# Patient Record
Sex: FEMALE | Race: WHITE | NOT HISPANIC OR LATINO | Employment: FULL TIME | URBAN - METROPOLITAN AREA
[De-identification: names, ages, dates, MRNs, and addresses within clinical notes are randomized per-mention and may not be internally consistent; named-entity substitution may affect disease eponyms.]

---

## 2017-10-09 ENCOUNTER — DOCUMENTATION ONLY (OUTPATIENT)
Dept: FAMILY MEDICINE | Facility: CLINIC | Age: 52
End: 2017-10-09

## 2017-10-09 NOTE — PROGRESS NOTES
Pre-Visit Chart Review  For Appointment Scheduled on 10/10/2017    Health Maintenance Due   Topic Date Due    Hepatitis C Screening  1965    TETANUS VACCINE  02/03/1982    Colonoscopy  02/03/2014    Lipid Panel  06/23/2014    Mammogram  01/31/2015    Pap Smear with HPV Cotest  01/31/2016    Influenza Vaccine  08/01/2017

## 2017-10-10 ENCOUNTER — OFFICE VISIT (OUTPATIENT)
Dept: FAMILY MEDICINE | Facility: CLINIC | Age: 52
End: 2017-10-10
Payer: COMMERCIAL

## 2017-10-10 VITALS
HEIGHT: 63 IN | DIASTOLIC BLOOD PRESSURE: 77 MMHG | HEART RATE: 65 BPM | TEMPERATURE: 99 F | SYSTOLIC BLOOD PRESSURE: 127 MMHG | WEIGHT: 183 LBS | BODY MASS INDEX: 32.43 KG/M2

## 2017-10-10 DIAGNOSIS — G89.29 CHRONIC PAIN OF LEFT HEEL: ICD-10-CM

## 2017-10-10 DIAGNOSIS — M79.672 CHRONIC PAIN OF LEFT HEEL: ICD-10-CM

## 2017-10-10 DIAGNOSIS — G89.29 CHRONIC NONINTRACTABLE HEADACHE, UNSPECIFIED HEADACHE TYPE: ICD-10-CM

## 2017-10-10 DIAGNOSIS — R51.9 CHRONIC NONINTRACTABLE HEADACHE, UNSPECIFIED HEADACHE TYPE: ICD-10-CM

## 2017-10-10 DIAGNOSIS — Z12.11 SCREEN FOR COLON CANCER: ICD-10-CM

## 2017-10-10 DIAGNOSIS — R53.83 FATIGUE, UNSPECIFIED TYPE: ICD-10-CM

## 2017-10-10 DIAGNOSIS — Z13.220 SCREENING FOR LIPID DISORDERS: ICD-10-CM

## 2017-10-10 DIAGNOSIS — Z00.00 ANNUAL PHYSICAL EXAM: Primary | ICD-10-CM

## 2017-10-10 DIAGNOSIS — R12 HEARTBURN: ICD-10-CM

## 2017-10-10 DIAGNOSIS — Z13.1 SCREENING FOR DIABETES MELLITUS: ICD-10-CM

## 2017-10-10 DIAGNOSIS — M72.2 PLANTAR FASCIITIS: ICD-10-CM

## 2017-10-10 DIAGNOSIS — Z12.39 BREAST CANCER SCREENING: ICD-10-CM

## 2017-10-10 DIAGNOSIS — R03.0 ELEVATED SYSTOLIC BLOOD PRESSURE READING WITHOUT DIAGNOSIS OF HYPERTENSION: ICD-10-CM

## 2017-10-10 DIAGNOSIS — R42 DIZZINESS: ICD-10-CM

## 2017-10-10 PROCEDURE — 99386 PREV VISIT NEW AGE 40-64: CPT | Mod: S$GLB,,, | Performed by: NURSE PRACTITIONER

## 2017-10-10 PROCEDURE — 99999 PR PBB SHADOW E&M-EST. PATIENT-LVL IV: CPT | Mod: PBBFAC,,, | Performed by: NURSE PRACTITIONER

## 2017-10-10 RX ORDER — NAPROXEN 500 MG/1
500 TABLET ORAL 2 TIMES DAILY WITH MEALS
Qty: 60 TABLET | Refills: 0 | Status: SHIPPED | OUTPATIENT
Start: 2017-10-10 | End: 2022-06-23

## 2017-10-10 NOTE — PROGRESS NOTES
Subjective:       Patient ID: Clyde Arellano is a 52 y.o. female.    Chief Complaint: Dizziness    HPI   Chief Complaint  Chief Complaint   Patient presents with    Dizziness       HPI  Clyde Arellano is a 52 y.o. female with multiple medical diagnoses as listed in the medical history and problem list that presents as a new patient to establish care, annual check up,  and  with a c/o dizziness with onset on 10/6/17 while driving. Dizziness had a sudden onset, felt like an off balance feeling, noticed a darkness coming over eyes like going to pass out and pulled car over.  Waited 30 minutes, still had some feeling of 'hangover' but was able to drive home. When she got home her  checked her blood pressure and it was elevated 150's/80's.  Since Friday has had intermittent spells of lightheadedness or dizziness without feeling like she is going to faint.  Did experience some nausea with dizziness for first two days.  Blood pressure checked at home on Sunday and elevated again, and relative gave patient HCTZ 12.5 mg several tablets and patient took one on Sunday, 2 on Monday, and one this morning.  Blood pressure is normal in office. Dizzy spells seem to be less frequent today.  Denies numbness and tingling.  Does suffer with headaches, gets a headache about 2 times per week, describes as throbbing to forehead and temples, positive phonophobia, negative photophobia, no aura. Headaches experienced for long time, years. Takes Goody powder x 2 with relief.  Also has pain to left foot/heel that is throbbing in nature, worse when first getting out of bed, improves with walking, but with throbbing when she goes to bed, relieved with Goody's powder some nights.        PAST MEDICAL HISTORY:  History reviewed. No pertinent past medical history.    PAST SURGICAL HISTORY:  Past Surgical History:   Procedure Laterality Date    DILATION AND CURETTAGE OF UTERUS      ELBOW FRACTURE SURGERY         SOCIAL HISTORY:  Social  History     Social History    Marital status:      Spouse name: N/A    Number of children: N/A    Years of education: N/A     Occupational History    Not on file.     Social History Main Topics    Smoking status: Never Smoker    Smokeless tobacco: Never Used    Alcohol use Yes    Drug use: No    Sexual activity: Yes     Partners: Male     Birth control/ protection: Post-menopausal     Other Topics Concern    Not on file     Social History Narrative    No narrative on file       FAMILY HISTORY:  Family History   Problem Relation Age of Onset    Cancer Maternal Grandfather      stomach    Cancer Sister      lymph/kidney       ALLERGIES AND MEDICATIONS: updated and reviewed.  Review of patient's allergies indicates:  No Known Allergies  Current Outpatient Prescriptions   Medication Sig Dispense Refill    naproxen (NAPROSYN) 500 MG tablet Take 1 tablet (500 mg total) by mouth 2 (two) times daily with meals. 60 tablet 0     No current facility-administered medications for this visit.        Review of Systems   Constitutional: Positive for fatigue. Negative for activity change, appetite change, chills, fever and unexpected weight change.        Fatigued, recently changed hours at work and is only sleeping about 5 hours per night   HENT: Negative for congestion, ear pain, postnasal drip, rhinorrhea, sore throat and trouble swallowing.    Eyes: Negative for visual disturbance.        Wears glasses at night to drive only   Respiratory: Negative for cough and shortness of breath.          states that patient snores, no witnessed apnea   Cardiovascular: Positive for leg swelling. Negative for chest pain and palpitations.        Some swelling to ankles with long periods of standing   Gastrointestinal: Negative for abdominal pain, blood in stool, constipation, diarrhea, nausea and vomiting.        Does get heartburn throughout the day x many years, uses TUMs OTC with relief   Genitourinary: Negative  "for difficulty urinating, frequency and menstrual problem.        Has had hot flashes x 10 years   Musculoskeletal: Positive for myalgias.        Left foot heel pain x several years, pain is worse when first getting OOB in AM or if off of feet for a while and then gets up, pain decreases with walking; Pain is throbbing to heel many nights at bedtime, takes Goodys powder with relief sometimes.    Skin: Negative for rash and wound.   Neurological: Positive for dizziness, light-headedness and headaches. Negative for tremors, weakness and numbness.   Hematological: Negative for adenopathy.   Psychiatric/Behavioral: Negative for dysphoric mood, sleep disturbance and suicidal ideas. The patient is not nervous/anxious.        Objective:       Vitals:    10/10/17 1529   BP: 127/77   BP Location: Right arm   Patient Position: Sitting   BP Method: Medium (Automatic)   Pulse: 65   Temp: 98.5 °F (36.9 °C)   TempSrc: Oral   Weight: 83 kg (183 lb)   Height: 5' 3" (1.6 m)     Physical Exam   Constitutional: She is oriented to person, place, and time. Vital signs are normal. She appears well-developed and well-nourished. No distress.   HENT:   Head: Normocephalic and atraumatic.   Right Ear: Tympanic membrane, external ear and ear canal normal.   Left Ear: Tympanic membrane, external ear and ear canal normal.   Nose: Nose normal. No mucosal edema.   Mouth/Throat: Oropharynx is clear and moist. No oropharyngeal exudate.   Eyes: Conjunctivae and EOM are normal. Pupils are equal, round, and reactive to light. Right eye exhibits no discharge. Left eye exhibits no discharge. Right conjunctiva is not injected. Left conjunctiva is not injected.   Neck: Normal range of motion and full passive range of motion without pain. Neck supple. Normal carotid pulses present. Carotid bruit is not present. No thyromegaly present.   Cardiovascular: Normal rate, regular rhythm, S1 normal, S2 normal, normal heart sounds and intact distal pulses.  Exam " reveals no gallop and no friction rub.    No murmur heard.  Pulses:       Carotid pulses are 2+ on the right side, and 2+ on the left side.       Radial pulses are 2+ on the right side, and 2+ on the left side.        Posterior tibial pulses are 2+ on the right side, and 2+ on the left side.   Pulmonary/Chest: Effort normal and breath sounds normal. No respiratory distress. She has no decreased breath sounds. She has no wheezes. She has no rhonchi. She has no rales.   Abdominal: Soft. Bowel sounds are normal. She exhibits no distension, no abdominal bruit, no pulsatile midline mass and no mass. There is no hepatosplenomegaly. There is no tenderness. No hernia.   Musculoskeletal: She exhibits no edema or deformity.        Left foot: There is tenderness.   Tenderness with palpation to posterior foot proximal to heel, point tenderness with pressure over center of heel   Lymphadenopathy:     She has no cervical adenopathy.   Neurological: She is alert and oriented to person, place, and time. She has normal strength. No cranial nerve deficit.   Skin: Skin is warm, dry and intact. Capillary refill takes less than 2 seconds. No rash noted. She is not diaphoretic. No pallor.   Psychiatric: She has a normal mood and affect. Her speech is normal and behavior is normal. Judgment and thought content normal. Her mood appears not anxious. Cognition and memory are normal. She does not exhibit a depressed mood.   Nursing note and vitals reviewed.      Assessment:       1. Annual physical exam    2. Dizziness    3. Chronic nonintractable headache, unspecified headache type    4. Chronic pain of left heel    5. Plantar fasciitis    6. Fatigue, unspecified type    7. Elevated systolic blood pressure reading without diagnosis of hypertension    8. Screening for diabetes mellitus    9. Breast cancer screening    10. Screening for lipid disorders    11. Screen for colon cancer    12. Heartburn    13. BMI 32.0-32.9,adult        Plan:    Clyde was seen today for dizziness.  Patient is very resistant to preventative care.  I asked the patient what she is willing to do at this time. Plan was made based on patient's willingness to participate.   Diagnoses and all orders for this visit:    Annual physical exam  -     CBC auto differential; Future  -     Comprehensive metabolic panel; Future  -     Lipid panel; Future    Dizziness   Possible benign vertigo vs dizziness associated with hypertension, seems to be resolving per patient report.     Educational handouts provided. Dizziness and Vertigo    Chronic nonintractable headache, unspecified headache type  -     naproxen (NAPROSYN) 500 MG tablet; Take 1 tablet (500 mg total) by mouth 2 (two) times daily with meals.    Chronic pain of left heel  -     X-Ray Foot Complete Left; Future  -     naproxen (NAPROSYN) 500 MG tablet; Take 1 tablet (500 mg total) by mouth 2 (two) times daily with meals.    Plantar fasciitis  -     X-Ray Foot Complete Left; Future  -     naproxen (NAPROSYN) 500 MG tablet; Take 1 tablet (500 mg total) by mouth 2 (two) times daily with meals.  - Educational handouts provided.   - Discussed and demonstrated exercises to patient to stretch tendon to relieve pain, roll foot over frozen water bottle when seated.  Advised doing exercises 2 times daily.  Do not go barefoot, wear shoe with good arch support.    Fatigue, unspecified type  -     CBC auto differential; Future  -     Comprehensive metabolic panel; Future  -     TSH; Future  - Fatigue may be due to short sleep time, discussed earlier bedtime    Elevated systolic blood pressure reading without diagnosis of hypertension  -     Comprehensive metabolic panel; Future  - Patient may have hypertension but has been taking HCTZ 12.5 mg (relative's medication) x 3 days and blood pressure is normal today.  Plan to stop HCTZ and return in one week for nurse visit for blood pressure check.  If BP elevated at nurse visit, will prescribe  anti-hypertensive.    Screening for diabetes mellitus  -     Comprehensive metabolic panel; Future    Breast cancer screening  -     Mammo Digital Screening Bilat with CAD; Future    Screening for lipid disorders  -     Lipid panel; Future    Screen for colon cancer  -     Fecal Immunochemical Test (iFOBT); Future   Patient refused colonoscopy but is willing to do Fit Kit    Heartburn   Continue Tums as needed, decrease use of Goody's powder, if no improvement may consider ranitidine or short term PPI     BMI 32.0-32.9,adult    Weight loss recommended with well balanced diet and portion controlled meals and increased activity.     Patient to follow up one week for nurse visit blood pressure check, 4 weeks for office visit with Pap.     Patient readiness: acceptance and barriers:none    During the course of the visit the patient was educated and counseled about the following:     Obesity:   General weight loss/lifestyle modification strategies discussed (elicit support from others; identify saboteurs; non-food rewards, etc).  Informal exercise measures discussed, e.g. taking stairs instead of elevator.    Goals: Obesity: Reduce calorie intake and BMI    Did patient meet goals/outcomes: No    The following self management tools provided: declined    Patient Instructions (the written plan) was given to the patient/family.     Time spent with patient: 15 minutes

## 2017-10-10 NOTE — PATIENT INSTRUCTIONS
Dizziness (Uncertain Cause)  Dizziness is a common symptom. It may be described as lightheadedness, spinning, or feeling like you are going to faint. Dizziness can have many causes.  Be sure to tell the healthcare provider about:  · All medicines you take, including prescription, over-the-counter, herbs, and supplements  · Any other symptoms you have  · Any health problems you are being treated for  · Anything that causes the dizziness to get worse or better  Today's exam did not show an exact cause for your dizziness. Other tests may be needed. Follow up with your healthcare provider.  Home care  · Dizziness that occurs with sudden standing may be a sign of mild dehydration. Drink extra fluids for the next few days.  · If you recently started a new medicine, stopped a medicine, or had the dose of a current medicine changed, talk with the prescribing healthcare provider. Your medicine plan may need adjustment.  · If dizziness lasts more than a few seconds, sit or lie down until it passes. This may help prevent injury in case you pass out.  · Do not drive or use power tools or dangerous equipment until you have had no dizziness for at least 48 hours.  Follow-up care  Follow up with your healthcare provider for further evaluation within the next 7 days or as advised.  When to seek medical advice  Call your healthcare provider for any of the following:  · Worsening of symptoms or new symptoms  · Passing out or seizure  · Repeated vomiting  · Headache  · Palpitations (the sense that your heart is fluttering or beating fast or hard)  · Shortness of breath  · Blood in vomit or stool (black or red color)  · Weakness of an arm or leg or one side of the face  · Vision or hearing changes  · Trouble walking or speaking  · Chest, arm, neck, back, or jaw pain  Date Last Reviewed: 8/23/2015  © 6585-7075 [employer]. [address] [place] [address], [place] [address]. All rights reserved. This information is not intended as  "a substitute for professional medical care. Always follow your healthcare professional's instructions.        Headache, Unspecified    A number of things can cause headaches. The cause of your headache isnt clear. But it doesnt seem to be a sign of any serious illness.  You could have a tension headache or a migraine headache.  Stress can cause a tension headache. This can happen if you tense the muscles of your shoulders, neck, and scalp without knowing it. If this stress lasts long enough, you may develop a tension headache.  It is not clear why migraines occur, but certain things called" triggers" can raise the risk of having a migraine attack. Migraine triggers may include emotional stress or depression, or by hormone changes during the menstrual cycle. Other triggers include birth control pills and other medicines, alcohol or caffeine, foods with tyramine (such as aged cheese, wine), eyestrain, weather changes, missed meals, and lack of sleep or oversleeping.  Other causes of headache include:  · Viral illness with high fever  · Head injury with concussion  · Sinus, ear, or throat infection  · Dental pain and jaw joint (TMJ) pain  More serious but less common causes of headache include stroke, brain hemorrhage, brain tumor, meningitis, and encephalitis.  Home care  Follow these tips when taking care of yourself at home:  · Dont drive yourself home if you were given pain medicine for your headache. Instead, have someone else drive you home. Try to sleep when you get home. You should feel much better when you wake up.  · Apply heat to the back of your neck to ease a neck muscle spasm. Take care of a migraine headache by putting an ice pack on your forehead or at the base of your skull.  · If you have nausea or vomiting, eat a light diet until your headache eases.  · If you have a migraine headache, use sunglasses when in the daylight or around bright indoor lighting until your symptoms get better. Bright " glaring light can make this type of headache worse.  Follow-up care  Follow up with your healthcare provider, or as advised. Talk with your provider if you have frequent headaches. He or she can help figure out a treatment plan. By knowing the earliest signs of headache, and starting treatment right away, you may be able to stop the pain yourself.  When to seek medical advice  Call your healthcare provider right away if any of these occur:  · Your head pain suddenly gets worse after sexual intercourse or strenuous activity  · Your head pain doesnt get better within 24 hours  · You arent able to keep liquids down (repeated vomiting)  · Fever of 100.4ºF (38ºC) or higher, or as directed by your healthcare provider  · Stiff neck  · Extreme drowsiness, confusion, or fainting  · Dizziness or dizziness with spinning sensation (vertigo)  · Weakness in an arm or leg or one side of your face  · You have trouble talking or seeing  Date Last Reviewed: 8/1/2016  © 9962-6601 [employer]. [address] [place] [address]. All rights reserved. This information is not intended as a substitute for professional medical care. Always follow your healthcare professional's instructions.        Rebound Headache     Overuse of pain medications can lead to rebound headaches.   You use pain medicines called analgesics to treat your headaches. You are now having more frequent or intense headaches (rebound headaches). They are your bodys response to too much pain medicine. Prescription pain medicines can cause these headaches. But so can over-the-counter medicines like acetaminophen or ibuprofen. A drug that contains caffeine or butalbital is most likely to cause rebound headache.  Symptoms of rebound headache include:  · Mild to moderate headache for 15 or more days each month for 3 months or more  · Headache when you wake up that continues most of the day  · Headaches getting worse over time  · Need for more and more  medicine to treat headaches  Rebound headaches are most often diagnosed by your symptoms and medicine history. You may need tests to rule out other causes of your headaches. In the emergency room, you may be given a non-analgesic pain medicine to treat the pain or stop future headaches.  Home care  Treatment involves stopping use of your pain medicines. Your healthcare provider can tell you how to safely do this. You may be able to stop right away. Or you may need to take less and less over time (taper off). This will depend on the medicines you have been taking. To do this, follow the schedule that your provider gives you. If you are taking pain medicines for other types of pain at the same time, your healthcare provider may need other specialists to participate in your care.  · For the first week or so after stopping, the headaches will likely get worse. You may also have withdrawal symptoms. These often include nausea, vomiting, and trouble sleeping. You may be given a medicine to help relieve pain and withdrawal symptoms. Take this exactly as you have been told. It is vital to avoid taking daily pain medicine. If you do so, rebound headaches will continue.  · Caffeine can make rebound headaches worse. If you have caffeinated drinks every day, slowly cut your intake.  · Keep a written log of your headaches. This can help you and your healthcare provider track your progress.  · Be patient. It can take about 2 to 6 months to stop having rebound headaches.  · Once you have broken the headache cycle, be careful not to start it again. Work with your provider to make a treatment plan for headache pain that has low risk of causing rebound headaches.  · Relaxation can help lower tension and relieve pain. Try a massage, meditation, yoga, or other relaxation techniques. Or make time for a relaxing hobby that you enjoy.  Follow-up care  Follow up with your healthcare provider, or as advised.  When to seek medical  advice  Call your healthcare provider right away if any of these occur:  · Fever of 100.4°F (38°C) or higher  · Headaches that wake you from sleep  · Repeated vomiting or visual problems that don't go away  · Headache with a stiff neck, rash, confusion, weakness, numbness, seizure (convulsion), or trouble talking  · Headache that starts after a head injury or fall  · A type of headache you have never had before  · Headache that gets worse despite rest and medicine  Date Last Reviewed: 11/20/2015 © 2000-2017 [employer]. [address] [place] [id number]. All rights reserved. This information is not intended as a substitute for professional medical care. Always follow your healthcare professional's instructions.        Treating Plantar Fasciitis  First, your healthcare provider tries to determine the cause of your problem in order to suggest ways to relieve pain. If your pain is due to poor foot mechanics, custom-made shoe inserts (orthoses) may help.    Reduce symptoms  · To relieve mild symptoms, try aspirin, ibuprofen, or other medicines as directed. Rubbing ice on the affected area may also help.  · To reduce severe pain and swelling, your healthcare provider may prescribe pills or injections or a walking cast in some instances. Physical therapy, such as ultrasound or a daily stretching program, may also be recommended. Surgery is rarely required.  · To reduce symptoms caused by poor foot mechanics, your foot may be taped. This supports the arch and temporarily controls movement. Night splints may also help by stretching the fascia.  Control movement  If taping helps, your healthcare provider may prescribe orthoses. Built from plaster casts of your feet, these inserts control the way your foot moves. As a result, your symptoms should go away.  Reduce overuse  Every time your foot strikes the ground, the plantar fascia is stretched. You can reduce the strain on the plantar fascia and the  possibility of overuse by following these suggestions:  · Lose any excess weight.  · Avoid running on hard or uneven ground.  · Use orthoses at all times in your shoes and house slippers.  If surgery is needed  Your healthcare provider may consider surgery if other types of treatment don't control your pain. During surgery, the plantar fascia is partially cut to release tension. As you heal, fibrous tissue fills the space between the heel bone and the plantar fascia.   Date Last Reviewed: 10/14/2015  © 3970-8735 [employer]. [address] [place] [address]. All rights reserved. This information is not intended as a substitute for professional medical care. Always follow your healthcare professional's instructions.        Understanding Plantar Fasciitis    Plantar fasciitis is a condition that causes foot and heel pain. The plantar fascia is a tough band of tissue that runs across the bottom of the foot from the heel to the toes. This tissue pulls on the heel bone. It supports the arch of the foot as it pushes off the ground. If the tissue becomes irritated or red and swollen (inflamed), it is called plantar fasciitis.  How to say it  PLAN-tuhr [id number]   What causes plantar fasciitis?  Plantar fasciitis most often occurs from overusing the plantar fascia. The tissue may become damaged from activities that put repeated stress on the heel and foot. Or it may wear down over time with age and ankle stiffness. You are more likely to have plantar fasciitis if you:  · Do activities that require a lot of running, jumping, or dancing  · Have a job that requires being on your feet for long periods  · Are overweight or obese  · Have certain foot problems, such as a tight Achilles tendon, flat feet, or high arches  · Often wear poorly fitting shoes  Symptoms of plantar fasciitis  The condition most often causes pain in the heel and the bottom of the foot. The pain may occur when you take your first  steps in the morning. It may get better as you walk throughout the day. But as you continue to put weight on the foot, the pain often returns. Pain may also occur after standing or sitting for long periods.  Treating plantar fasciitis  Treatments for plantar fasciitis include:  · Resting the foot. This involves limiting movements that make your foot hurt. You may also need to avoid certain sports and types of work for a time.  · Using cold packs. Put an ice pack on the heel and foot to help reduce pain and swelling.  · Taking pain medicines. Prescription and over-the-counter pain medicines can help relieve pain and swelling.  · Using heel cups or foot inserts (orthotics). These are placed in the shoes to help support the heel or arch and cushion the heel. You may also be told to buy proper-fitting shoes with good arch support and cushioned soles.  · Taping the foot. This supports the arch and limits the movement of the plantar fascia to help relieve symptoms.  · Wearing a night splint. This stretches the plantar fascia and leg muscles while you sleep. This may help relieve pain.  · Doing exercises and physical therapy. These stretch and strengthen the plantar fascia and the muscles in the leg that support the heel and foot.  · Getting shots of medicine into the foot. These may help relieve symptoms for a time.  · Having surgery. This may be needed if other treatments fail to relieve symptoms. During surgery, the surgeon may partially cut the plantar fascia to release tension.  Possible complications of plantar fasciitis  Without proper care and treatment, healing may take longer than normal. Also, symptoms may continue or get worse. Over time, the plantar fascia may be damaged. This can make it hard to walk or even stand without pain.  When to call your healthcare provider  Call your healthcare provider right away if you have any of these:  · Fever of 100.4°F (38°C) or higher, or as directed  · Symptoms that dont  get better with treatment, or get worse  · New symptoms, such as numbness, tingling, or weakness in the foot   Date Last Reviewed: 3/10/2016  © 5522-0832 [employer]. [address] [place] [address], [place] [address]. All rights reserved. This information is not intended as a substitute for professional medical care. Always follow your healthcare professional's instructions.        Vertigo (Unknown Cause)    In addition to helping with hearing, the inner ear is part of the balance center of your body. Problems with the inner ear can a false feeling of motion. This is called vertigo. Often, it feels as if you or the room is spinning. A vertigo attack may cause sudden nausea, vomiting and heavy sweating. Severe vertigo causes a loss of balance and can cause you to fall. During vertigo, small head movements and changes in body position will often make the symptoms worse. You may also have ringing in the ears called tinnitus.  An episode of vertigo may last seconds, minutes or hours. Once you are over the first episode, it may never come back. However, symptoms may return off and on.  The cause of your vertigo is not yet known. Possible causes of vertigo include:  · Inflammation of the inner ear  · Disease of the nerves to the inner ear  · Movement of calcium particles in the inner ear  · Poor blood flow to the balance centers of the brain  · Migraine headaches  Home care  · If symptoms are severe, rest quietly in bed. Change positions very slowly. There is usually one position that will feel best, such as lying on one side or lying on your back with your head slightly raised on pillows.  · Do not drive a car or work with dangerous machinery until symptoms have been gone for at least one week.  · Take medicine as prescribed to relieve your symptoms. Unless another medicine was prescribed for symptoms of nausea, vomiting, and dizziness, you may use over-the-counter motion sickness pills. Ask your pharmacist for  suggestions.  Follow-up care  Follow up with your healthcare provider or as directed. If you are referred to a specialist or for testing, make the appointment promptly.  When to seek medical advice  Call your healthcare provider if any of the following occur:  · Fever of 100.4°F (38ºC) or higher, or as directed by your healthcare provider  · Vertigo worsens or is not controlled by prescribed medicine   · Repeated vomiting not relieved by prescribed medicine   · Severe headache  · Confusion  · Weakness of an arm or leg or one side of the face  · Difficulty with speech or vision  · Loss of consciousness   · Seizure  Date Last Reviewed: 8/16/2015  © 9837-8659 [employer]. [address] [place] [address], [place] [address]. All rights reserved. This information is not intended as a substitute for professional medical care. Always follow your healthcare professional's instructions.

## 2017-10-13 ENCOUNTER — HOSPITAL ENCOUNTER (OUTPATIENT)
Dept: RADIOLOGY | Facility: CLINIC | Age: 52
Discharge: HOME | End: 2017-10-13
Attending: NURSE PRACTITIONER
Payer: COMMERCIAL

## 2017-10-13 VITALS — HEIGHT: 63 IN | WEIGHT: 183 LBS | BODY MASS INDEX: 32.43 KG/M2

## 2017-10-13 DIAGNOSIS — Z12.39 BREAST CANCER SCREENING: ICD-10-CM

## 2017-10-13 DIAGNOSIS — M72.2 PLANTAR FASCIITIS: ICD-10-CM

## 2017-10-13 DIAGNOSIS — M79.672 CHRONIC PAIN OF LEFT HEEL: ICD-10-CM

## 2017-10-13 DIAGNOSIS — G89.29 CHRONIC PAIN OF LEFT HEEL: ICD-10-CM

## 2017-10-13 PROCEDURE — 77067 SCR MAMMO BI INCL CAD: CPT | Mod: TC

## 2017-10-13 PROCEDURE — 77063 BREAST TOMOSYNTHESIS BI: CPT | Mod: 26,,, | Performed by: RADIOLOGY

## 2017-10-13 PROCEDURE — 77067 SCR MAMMO BI INCL CAD: CPT | Mod: 26,,, | Performed by: RADIOLOGY

## 2017-10-13 PROCEDURE — 73630 X-RAY EXAM OF FOOT: CPT | Mod: TC,PO,LT

## 2017-10-13 PROCEDURE — 73630 X-RAY EXAM OF FOOT: CPT | Mod: 26,LT,S$GLB, | Performed by: RADIOLOGY

## 2017-10-14 ENCOUNTER — TELEPHONE (OUTPATIENT)
Dept: FAMILY MEDICINE | Facility: CLINIC | Age: 52
End: 2017-10-14

## 2017-10-17 ENCOUNTER — CLINICAL SUPPORT (OUTPATIENT)
Dept: FAMILY MEDICINE | Facility: CLINIC | Age: 52
End: 2017-10-17
Payer: COMMERCIAL

## 2017-10-17 VITALS — DIASTOLIC BLOOD PRESSURE: 72 MMHG | SYSTOLIC BLOOD PRESSURE: 126 MMHG

## 2017-10-17 DIAGNOSIS — I10 ESSENTIAL HYPERTENSION: Primary | ICD-10-CM

## 2017-10-17 PROCEDURE — 99499 UNLISTED E&M SERVICE: CPT | Mod: S$GLB,,, | Performed by: FAMILY MEDICINE

## 2017-10-21 ENCOUNTER — LAB VISIT (OUTPATIENT)
Dept: LAB | Facility: HOSPITAL | Age: 52
End: 2017-10-21
Payer: COMMERCIAL

## 2017-10-21 DIAGNOSIS — Z12.11 SCREEN FOR COLON CANCER: ICD-10-CM

## 2017-10-21 PROCEDURE — 82274 ASSAY TEST FOR BLOOD FECAL: CPT

## 2017-10-24 LAB — HEMOCCULT STL QL IA: NEGATIVE

## 2017-10-26 ENCOUNTER — TELEPHONE (OUTPATIENT)
Dept: FAMILY MEDICINE | Facility: CLINIC | Age: 52
End: 2017-10-26

## 2017-11-06 ENCOUNTER — DOCUMENTATION ONLY (OUTPATIENT)
Dept: FAMILY MEDICINE | Facility: CLINIC | Age: 52
End: 2017-11-06

## 2017-11-06 NOTE — PROGRESS NOTES
Pre-Visit Chart Review  For Appointment Scheduled on 11/7/2017    Health Maintenance Due   Topic Date Due    Hepatitis C Screening  1965    TETANUS VACCINE  02/03/1982    Colonoscopy  02/03/2014    Pap Smear with HPV Cotest  01/31/2016    Influenza Vaccine  08/01/2017

## 2017-11-07 ENCOUNTER — OFFICE VISIT (OUTPATIENT)
Dept: FAMILY MEDICINE | Facility: CLINIC | Age: 52
End: 2017-11-07
Payer: COMMERCIAL

## 2017-11-07 ENCOUNTER — OFFICE VISIT (OUTPATIENT)
Dept: PODIATRY | Facility: CLINIC | Age: 52
End: 2017-11-07
Payer: COMMERCIAL

## 2017-11-07 VITALS
DIASTOLIC BLOOD PRESSURE: 73 MMHG | WEIGHT: 182 LBS | BODY MASS INDEX: 33.49 KG/M2 | SYSTOLIC BLOOD PRESSURE: 115 MMHG | HEART RATE: 59 BPM | TEMPERATURE: 98 F | HEIGHT: 62 IN

## 2017-11-07 VITALS — WEIGHT: 182.13 LBS | HEIGHT: 62 IN | BODY MASS INDEX: 33.51 KG/M2

## 2017-11-07 DIAGNOSIS — Z01.419 CERVICAL SMEAR, AS PART OF ROUTINE GYNECOLOGICAL EXAMINATION: ICD-10-CM

## 2017-11-07 DIAGNOSIS — M21.6X2 ACQUIRED EQUINUS DEFORMITY OF LEFT FOOT: ICD-10-CM

## 2017-11-07 DIAGNOSIS — M72.2 PLANTAR FASCIITIS OF LEFT FOOT: ICD-10-CM

## 2017-11-07 DIAGNOSIS — M79.672 CHRONIC PAIN OF LEFT HEEL: Primary | ICD-10-CM

## 2017-11-07 DIAGNOSIS — G89.29 CHRONIC HEEL PAIN, LEFT: ICD-10-CM

## 2017-11-07 DIAGNOSIS — M72.2 PLANTAR FASCIITIS, LEFT: ICD-10-CM

## 2017-11-07 DIAGNOSIS — Z01.419 ENCOUNTER FOR GYNECOLOGICAL EXAMINATION WITHOUT ABNORMAL FINDING: Primary | ICD-10-CM

## 2017-11-07 DIAGNOSIS — M79.672 CHRONIC HEEL PAIN, LEFT: ICD-10-CM

## 2017-11-07 DIAGNOSIS — G89.29 CHRONIC PAIN OF LEFT HEEL: Primary | ICD-10-CM

## 2017-11-07 PROCEDURE — 99999 PR PBB SHADOW E&M-EST. PATIENT-LVL II: CPT | Mod: PBBFAC,,, | Performed by: PODIATRIST

## 2017-11-07 PROCEDURE — 88175 CYTOPATH C/V AUTO FLUID REDO: CPT

## 2017-11-07 PROCEDURE — 99203 OFFICE O/P NEW LOW 30 MIN: CPT | Mod: S$GLB,,, | Performed by: PODIATRIST

## 2017-11-07 PROCEDURE — 99396 PREV VISIT EST AGE 40-64: CPT | Mod: S$GLB,,, | Performed by: NURSE PRACTITIONER

## 2017-11-07 PROCEDURE — 99999 PR PBB SHADOW E&M-EST. PATIENT-LVL IV: CPT | Mod: PBBFAC,,, | Performed by: NURSE PRACTITIONER

## 2017-11-07 RX ORDER — METHYLPREDNISOLONE 4 MG/1
TABLET ORAL
Qty: 1 PACKAGE | Refills: 0 | Status: SHIPPED | OUTPATIENT
Start: 2017-11-07 | End: 2017-11-28

## 2017-11-07 NOTE — PATIENT INSTRUCTIONS
1. Stretch calf and plantar fascia 3x per day for 30 sec and before getting out of bed.    2. Supportive shoes at all times (athletic shoe including lawrence, new balance, asics, HOKA or casual shoes like Dansko, Susana, Naot, Vionoic, Fit flop  clog or wedge with extra heel padding and arch support.    (Varsity sports, Phidippides, [place] running company, Masseys, Goodfeet, Cantilever, Feet First, Foot Solutions, Therapeutic shoes, SAS, [facility] inDegree center pro shop) http://[email or web address].ObsEva/    3. Orthotic (recommend the following brands: Superfeet, Spenco, Powerstep, Sof Sole Fit Series)    4. NSAID's for 2-3 weeks if no GI or Kidney problems (example: ibuprofen 600 mg 2 x per day)    5. ICE massage with frozen water bottle 2x per day for 30 minutes.    6.  therapy and/or steroid injection    What Is Plantar Fasciitis?   The plantar fascia is a ligament-like band running from your heel to the ball of your foot. This band pulls on the heel bone, raising the arch of your foot as it pushes off the ground. But if your foot moves incorrectly, the plantar fascia may become strained. The fascia may swell and its tiny fibers may begin to fray, causing plantar fasciitis.  Causes  Plantar fasciitis is often caused by poor foot mechanics. If your foot flattens too much, the fascia may overstretch and swell. If your foot flattens too little, the fascia may ache from being pulled too tight.    The plantar fascia is a thick, fibrous layer of tissue that covers the bones on the bottom of your foot. It holds the foot bones in an arched position. Plantar fasciitis is a painful swelling of the plantar fascia.  A heel spur is an overgrowth of bone where the plantar fascia attaches to the heel bone. The heel spur itself usually doesnt cause pain. However, the heel spur might be a sign of plantar fasciitis which may cause your foot pain. There is no specific treatment for heel spurs.   Plantar fasciitis can develop slowly or  suddenly. It usually affects one foot at a time. Heel pain can feel sharp, like a knife sticking into the bottom of your foot. You may feel pain after exercising, long-distance jogging, stair climbing, long periods of standing, or after standing up.  Risk factors for plantar fasciitis include: arthritis, diabetes, obesity or recent weight gain, flat foot, and having high arches. Wearing high heels, loose shoes, or shoes with poor arch support adds to the risk.    Foot pain is usually worse in the morning. But it improves with walking. By the end of the day there may be a dull aching. Treatment includes short-term rest and controlling inflammation. It may take up to 9 months before all symptoms go away. In rare cases, a steroid injection in the foot, or surgery, may be needed.  Home care  · If you are overweight, lose weight to help healing.  · Choose supportive shoes with good arch support and shock absorbency. Replace athletic shoes when they become worn out. Dont walk or run barefoot.  · Premade or custom-fitted shoe inserts may be helpful. Inserts made of silicone seem to be the most effective. Custom-made inserts can be provided by a podiatrist or foot specialist, physical therapist, or orthopedist.  · Premade or custom-made night splints keep the heel stretched out while you sleep. They may prevent morning pain.  · Avoid activities that stress the feet: jogging, prolonged standing or walking, contact sports, etc.  · First thing in the morning and before sports, stretch the bottom of your foot. Gently flex your ankle so the toes move toward your knee.  · Icing may help control heel pain. Apply an ice pack to the heel for 10-20 minutes as a preventive. Or ice your heel after a severe flare-up of symptoms. You may repeat this every 1-2 hours as needed.  · You may use over-the-counter pain medicine to control pain, unless another medicine was prescribed. Anti-inflammatory pain medicines, such as ibuprofen or  naproxen, may work better than acetaminophen. If you have chronic liver or kidney disease or ever had a stomach ulcer or GI bleeding, talk with your healthcare provider before using these medicines.  · Shoe inserts, a night splint, or a special boot may be needed. Use these as directed by your healthcare provider.      Treating Plantar Fasciitis    First, your doctor relieves pain. Then, the cause of your problem may be found and corrected. If your pain is due to poor foot mechanics, custom-made shoe inserts (orthoses) may help.    Reduce Symptoms:  · To relieve mild symptoms, try aspirin, ibuprofen, or other medications as directed. Rubbing ice on the affected area may also help.  · To reduce severe pain and swelling, your doctor may prescribe pills or injections or a walking cast in some instances. Physical therapy, such as ultrasound or a daily stretching program, may also be recommended. Surgery is rarely required.  · To reduce symptoms caused by poor foot mechanics, your foot may be taped. This supports the arch and temporarily controls movement. Night splints may also help by stretching the fascia.    Control Movement  If taping helps, your doctor may prescribe orthoses. Built from plaster casts of your feet, these inserts control the way your foot moves. As a result, your symptoms should go away.  If Surgery Is Needed  Your doctor may consider surgery if other types of treatment don't control your pain. During surgery, the plantar fascia is partially cut to release tension. As you heal, fibrous tissue fills the space between the heel bone and the plantar fascia.   Reduce Overuse  Every time your foot strikes the ground, the plantar fascia is stretched. You can reduce the strain on the plantar fascia and the possibility of overuse by following these suggestions:  · Lose any excess weight.  · Avoid running on hard or uneven ground.  · Use orthoses at all times in your shoes and house slippers.  © 9302-6639 The  [employer]. [address] [place] [address]. All rights reserved. This information is not intended as a substitute for professional medical care. Always follow your healthcare professional's instructions.            Lower Body Exercises: Calf Stretch    This exercise both stretches and strengthens your lower body to help your back. Do the exercise as often as suggested by your health care provider. As you work out, dont rush or strain. Use an exercise mat, pillow, or folded towel to protect your knees and other sensitive areas.  · Face a wall 2 feet away. Step toward the wall with one foot.  · Place both palms on the wall and bend your front knee.  · Lean forward, keeping the back leg straight and the heel on the floor.  · Hold for 20 seconds. Switch legs.  © 6215-1109 [employer]. [address] [place] [address]. All rights reserved. This information is not intended as a substitute for professional medical care. Always follow your healthcare professional's instructions.      These instructions are for your right foot. Switch sides for your left foot.  1. Sit in a chair. Rest your right ankle on your left knee.  2. Hold your toes with your right hand. Gently bend the toes backward. Feel a stretch in the undersides of the toes and ball of the foot. Hold for 30 to 60 seconds.  3. Then gently bend the toes in the other direction. Gently press on them until your foot is pointed. Hold for 30 to 60 seconds.  4. Repeat 5 times, or as instructed.  Date Last Reviewed: 5/1/2016  © 1189-6035 [employer]. [address] [place] [address]. All rights reserved. This information is not intended as a substitute for professional medical care. Always follow your healthcare professional's instructions.

## 2017-11-07 NOTE — LETTER
November 8, 2017      Katelyn Fung, NP  [address]  [facility] [address]           [facility]  [address]  [facility] [address]  Phone: [phone]          Patient: Clyde Arellano   MR Number: [id number]   YOB: 1965   Date of Visit: 11/7/2017       Dear Katelyn Fung:    Thank you for referring Clyde Arellano to me for evaluation. Attached you will find relevant portions of my assessment and plan of care.    If you have questions, please do not hesitate to call me. I look forward to following Clyde Arellano along with you.    Sincerely,    Dony Cole, DPDELIA    Enclosure  CC:  No Recipients    If you would like to receive this communication electronically, please contact [email or web address] or [phone] to request more information on [employer] Link access.    For providers and/or their staff who would like to refer a patient to [facility], please contact us through our one-stop-shop provider referral line, [facility], at [phone].    If you feel you have received this communication in error or would no longer like to receive these types of communications, please e-mail [email or web address]

## 2017-11-07 NOTE — PROGRESS NOTES
Subjective:       Patient ID: Clyde Arellano is a 52 y.o. female.    Chief Complaint: Gynecologic Exam    HPI   Chief Complaint  Chief Complaint   Patient presents with    Gynecologic Exam       HPI  Clyde Arellano is a 52 y.o. female with medical diagnoses as listed in the medical history and problem list that presents for routine gynecological exam.  Patient has never had an abnormal pap smear.  Has had 6 pregnancies and 4 live births, and 2 miscarriages. Patient has not had a menstrual period since age 44.  Does have some vaginal dryness and dyspareunia.  BMI today is 33.29 and weight is unchanged. Patient is known to me with her last appointment 10/17/2017.        PAST MEDICAL HISTORY:  History reviewed. No pertinent past medical history.    PAST SURGICAL HISTORY:  Past Surgical History:   Procedure Laterality Date    DILATION AND CURETTAGE OF UTERUS      ELBOW FRACTURE SURGERY         SOCIAL HISTORY:  Social History     Social History    Marital status:      Spouse name: N/A    Number of children: N/A    Years of education: N/A     Occupational History    Not on file.     Social History Main Topics    Smoking status: Never Smoker    Smokeless tobacco: Never Used    Alcohol use Yes    Drug use: No    Sexual activity: Yes     Partners: Male     Birth control/ protection: Post-menopausal     Other Topics Concern    Not on file     Social History Narrative    No narrative on file       FAMILY HISTORY:  Family History   Problem Relation Age of Onset    Cancer Maternal Grandfather      stomach    Cancer Sister      lymph/kidney       ALLERGIES AND MEDICATIONS: updated and reviewed.  Review of patient's allergies indicates:  No Known Allergies  Current Outpatient Prescriptions   Medication Sig Dispense Refill    naproxen (NAPROSYN) 500 MG tablet Take 1 tablet (500 mg total) by mouth 2 (two) times daily with meals. 60 tablet 0     No current facility-administered medications for this visit.   "      Review of Systems   Constitutional: Negative for activity change, appetite change, chills, fatigue, fever and unexpected weight change.   HENT: Negative for congestion, hearing loss, rhinorrhea, sinus pain and sore throat.    Eyes: Negative for visual disturbance.   Respiratory: Negative for cough and shortness of breath.    Cardiovascular: Negative for chest pain, palpitations and leg swelling.   Gastrointestinal: Negative for abdominal pain, constipation, diarrhea, nausea and vomiting.   Genitourinary: Positive for dyspareunia. Negative for difficulty urinating, dysuria, frequency, menstrual problem and urgency.   Musculoskeletal: Positive for arthralgias. Negative for myalgias.        Continues to have left foot pain, would like referral to podiatrist.  Has been taking naprosyn and doing exercises as recommended, wearing shoes with good arch support.    Skin: Negative for rash and wound.   Neurological: Negative for dizziness, tremors, weakness, numbness and headaches.   Hematological: Negative for adenopathy.   Psychiatric/Behavioral: Negative for dysphoric mood, sleep disturbance and suicidal ideas. The patient is not nervous/anxious.        Objective:       Vitals:    11/07/17 0922   BP: 115/73   BP Location: Right arm   Patient Position: Sitting   BP Method: Medium (Automatic)   Pulse: (!) 59   Temp: 98.2 °F (36.8 °C)   TempSrc: Oral   Weight: 82.6 kg (182 lb)   Height: 5' 2" (1.575 m)     Physical Exam   Constitutional: She is oriented to person, place, and time. She appears well-developed. No distress.   HENT:   Head: Normocephalic and atraumatic.   Eyes: Conjunctivae are normal. Right eye exhibits no discharge. Left eye exhibits no discharge.   Cardiovascular: Normal rate and regular rhythm.  Exam reveals no gallop and no friction rub.    No murmur heard.  Pulmonary/Chest: Effort normal and breath sounds normal. No respiratory distress. She has no wheezes. She has no rales. Right breast exhibits no " inverted nipple, no mass, no nipple discharge, no skin change and no tenderness. Left breast exhibits no inverted nipple, no mass, no nipple discharge, no skin change and no tenderness. Breasts are symmetrical. There is no breast swelling.   Breast exam is normal, had mammogram about 2 weeks ago that was without findings of malignancy   Abdominal: Soft. She exhibits no distension and no mass. There is no tenderness. No hernia.   Genitourinary: Vagina normal and uterus normal. No breast tenderness, discharge or bleeding. Pelvic exam was performed with patient supine. There is no rash, tenderness or lesion on the right labia. There is no rash, tenderness or lesion on the left labia. Cervix exhibits no motion tenderness, no discharge and no friability. Right adnexum displays no mass, no tenderness and no fullness. Left adnexum displays no mass, no tenderness and no fullness. No erythema in the vagina. No vaginal discharge found.   Genitourinary Comments: Pap smear obtained from cervical os with broom.    Musculoskeletal:        Left foot: There is tenderness.   Continues to have tenderness and pain with walking to left foot/heel   Neurological: She is alert and oriented to person, place, and time. She has normal strength.   Skin: Skin is warm, dry and intact. Capillary refill takes less than 2 seconds. She is not diaphoretic. No pallor.   Psychiatric: She has a normal mood and affect. Her speech is normal and behavior is normal. Judgment and thought content normal. Cognition and memory are normal.   Nursing note and vitals reviewed.      Assessment:       1. Encounter for gynecological examination without abnormal finding    2. Cervical smear, as part of routine gynecological examination    3. Chronic heel pain, left    4. Plantar fasciitis of left foot    5. BMI 33.0-33.9,adult        Plan:       Clyde was seen today for gynecologic exam.    Diagnoses and all orders for this visit:    Encounter for gynecological  examination without abnormal finding  -     Cancel: Liquid-based pap smear, screening  -     Cancel: Liquid-based pap smear, screening  -     HPV High Risk Genotypes, PCR  -     Liquid-based pap smear, screening    Cervical smear, as part of routine gynecological examination  -     Cancel: Liquid-based pap smear, screening  -     Cancel: Liquid-based pap smear, screening  -     HPV High Risk Genotypes, PCR  -     Liquid-based pap smear, screening    Chronic heel pain, left  -     Ambulatory referral to Podiatry  - Continue naprosyn and exercises, shoe with good arch support    Plantar fasciitis of left foot  -     Ambulatory referral to Podiatry    BMI 33.0-33.9,adult   Weight loss recommended with well balanced diet and portion controlled meals and increased activity.     Return in about 1 year (around 11/7/2018) for annual exam and lab.     Patient readiness: acceptance and barriers:readiness    During the course of the visit the patient was educated and counseled about the following:     Obesity:   General weight loss/lifestyle modification strategies discussed (elicit support from others; identify saboteurs; non-food rewards, etc).  Informal exercise measures discussed, e.g. taking stairs instead of elevator.  Regular aerobic exercise program discussed.    Goals: Obesity: Reduce calorie intake and BMI    Did patient meet goals/outcomes: No    The following self management tools provided: declined    Patient Instructions (the written plan) was given to the patient/family.     Time spent with patient: 30 minutes

## 2017-11-08 NOTE — PROGRESS NOTES
Subjective:      Patient ID: Clyde Arellano is a 52 y.o. female.    Chief Complaint: Heel Pain (c/o bone spur left foot stating she had x rays done); Foot Pain (8 on pain scale left foot); and Other Misc (PCP:  MILY Hinkle NP 11/7/17)    Clyde is a 52 y.o. female who presents to the clinic complaining of heel pain in the left foot, especially with the first step in the morning but always painful. The pain is described as Sharp fairly constant pain exacerbated with weight bearing. The onset of the pain was gradual and has worsened over the past 2 years. Clyde rates the pain as 10/10. She denies a history of trauma. Prior treatments include rest and wearing different kinds of shoes.    Review of Systems   Constitution: Negative for chills and fever.   Cardiovascular: Negative for claudication and leg swelling.   Respiratory: Negative for shortness of breath.    Skin: Negative for itching, nail changes and rash.   Musculoskeletal: Negative for muscle cramps, muscle weakness and myalgias.        Heel pain   Gastrointestinal: Negative for nausea and vomiting.   Neurological: Negative for focal weakness, loss of balance, numbness and paresthesias.           Objective:      Physical Exam   Constitutional: She is oriented to person, place, and time. She appears well-developed and well-nourished. No distress.   Cardiovascular:   Pulses:       Dorsalis pedis pulses are 2+ on the right side, and 2+ on the left side.        Posterior tibial pulses are 2+ on the right side, and 2+ on the left side.   < 3 sec capillary refill time to toes 1-5 bilateral. Toes and feet are warm to touch proximally with normal distal cooling b/l. There is some hair growth on the feet and toes b/l. There is no edema b/l. No spider veins or varicosities present b/l.      Musculoskeletal:   Pain on palpation plantar medial and central heel left foot. No pain with ROM or MMT. Some discomfort with medial and lateral compression of heel and with palpation  at the abductor muscle belly.    Equinus noted b/l ankles with < 5 deg DF noted. MMT 5/5 in DF/PF/Inv/Ev resistance with no reproduction of pain in any direction. Passive range of motion of ankle and pedal joints is painless b/l.     Neurological: She is alert and oriented to person, place, and time. She has normal strength. She displays no atrophy and no tremor. No sensory deficit. She exhibits normal muscle tone.   Negative tinel sign bilateral.   Skin: Skin is warm, dry and intact. No abrasion, no bruising, no burn, no ecchymosis, no laceration, no lesion, no petechiae and no rash noted. She is not diaphoretic. No cyanosis or erythema. No pallor. Nails show no clubbing.   Skin temperature, texture and turgor within normal limits.   Psychiatric: She has a normal mood and affect. Her behavior is normal.             Assessment:       Encounter Diagnoses   Name Primary?    Chronic pain of left heel Yes    Plantar fasciitis, left     Acquired equinus deformity of left foot          Plan:       Clyde was seen today for heel pain, foot pain and other misc.    Diagnoses and all orders for this visit:    Chronic pain of left heel    Plantar fasciitis, left    Acquired equinus deformity of left foot    Other orders  -     methylPREDNISolone (MEDROL DOSEPACK) 4 mg tablet; use as directed      I counseled the patient on her conditions, their implications and medical management.    Patient will stretch the tendo achilles complex three times daily as demonstrated in the office.  Literature was dispensed illustrating proper stretching technique.    Patient will obtain over the counter arch supports and wear them in shoes whenever possible.  Athletic shoes intended for walking or running are usually best.    Discussed PT and injections, she declined today    If pain persists will consider PT, Injection, orthopedic boot.    Return in 1 month for follow up foot pain.    Dony Cole DPM

## 2017-11-10 ENCOUNTER — TELEPHONE (OUTPATIENT)
Dept: FAMILY MEDICINE | Facility: CLINIC | Age: 52
End: 2017-11-10

## 2017-11-10 NOTE — TELEPHONE ENCOUNTER
Please call the patient and let her know that I have reviewed the results of her Pap smear.  The Pap smear is normal, negative for abnormal/cancerous cells.  It does show an atrophic pattern which is what we discussed as causing vaginal dryness and painful intercourse and is normal in postmenopausal women.   Thanks.  Katelyn

## 2018-07-03 ENCOUNTER — OFFICE VISIT (OUTPATIENT)
Dept: FAMILY MEDICINE | Facility: CLINIC | Age: 53
End: 2018-07-03
Payer: COMMERCIAL

## 2018-07-03 VITALS
BODY MASS INDEX: 34.89 KG/M2 | HEART RATE: 82 BPM | HEIGHT: 62 IN | OXYGEN SATURATION: 97 % | DIASTOLIC BLOOD PRESSURE: 70 MMHG | WEIGHT: 189.63 LBS | TEMPERATURE: 99 F | SYSTOLIC BLOOD PRESSURE: 110 MMHG

## 2018-07-03 DIAGNOSIS — R19.7 DIARRHEA, UNSPECIFIED TYPE: ICD-10-CM

## 2018-07-03 DIAGNOSIS — R11.2 NAUSEA AND VOMITING, INTRACTABILITY OF VOMITING NOT SPECIFIED, UNSPECIFIED VOMITING TYPE: Primary | ICD-10-CM

## 2018-07-03 PROCEDURE — 3074F SYST BP LT 130 MM HG: CPT | Mod: CPTII,S$GLB,, | Performed by: NURSE PRACTITIONER

## 2018-07-03 PROCEDURE — 3008F BODY MASS INDEX DOCD: CPT | Mod: CPTII,S$GLB,, | Performed by: NURSE PRACTITIONER

## 2018-07-03 PROCEDURE — 99213 OFFICE O/P EST LOW 20 MIN: CPT | Mod: S$GLB,,, | Performed by: NURSE PRACTITIONER

## 2018-07-03 PROCEDURE — 99999 PR PBB SHADOW E&M-EST. PATIENT-LVL III: CPT | Mod: PBBFAC,,, | Performed by: NURSE PRACTITIONER

## 2018-07-03 PROCEDURE — 3078F DIAST BP <80 MM HG: CPT | Mod: CPTII,S$GLB,, | Performed by: NURSE PRACTITIONER

## 2018-07-03 RX ORDER — ONDANSETRON 8 MG/1
8 TABLET, ORALLY DISINTEGRATING ORAL EVERY 6 HOURS PRN
Qty: 12 TABLET | Refills: 0 | Status: SHIPPED | OUTPATIENT
Start: 2018-07-03 | End: 2018-07-06

## 2018-07-03 NOTE — PROGRESS NOTES
Subjective:       Patient ID: Clyde Arellano is a 53 y.o. female.  First time seeing pt in clinic.  Last seen by SPRING Fung NP on 11/7/2017.   Reviewed pt's medical, surgical, social, and family hx.     Chief Complaint: Diarrhea (since 11 pm last night) and Emesis  Pt reports diarrhea, emesis and nausea that began last night at 11 pm.  Denies abdominal pain or fever. Pt  reports she has taken several Imodium. Pt denies eating any unusual foods or being around anyone that has been sick.   Diarrhea    This is a new problem. The current episode started yesterday. The problem occurs 2 to 4 times per day. The problem has been gradually improving. The stool consistency is described as watery. The patient states that diarrhea does not awaken her from sleep. Associated symptoms include sweats and vomiting. Pertinent negatives include no abdominal pain, chills, coughing, fever or URI. Treatments tried: imodium.   Emesis    This is a new problem. The current episode started yesterday. The problem occurs 2 to 4 times per day. The emesis has an appearance of stomach contents. There has been no fever. Associated symptoms include diarrhea and sweats. Pertinent negatives include no abdominal pain, chest pain, chills, coughing, fever or URI. She has tried increased fluids for the symptoms.     Vitals:    07/03/18 1006   BP: 110/70   Pulse: 82   Temp: 99.2 °F (37.3 °C)     No past medical history on file.  Review of Systems   Constitutional: Positive for diaphoresis. Negative for chills and fever.   HENT: Negative for congestion, ear discharge, ear pain, postnasal drip, rhinorrhea, sinus pain, sinus pressure, sneezing, sore throat and voice change.    Eyes: Negative for visual disturbance.   Respiratory: Negative for cough, chest tightness and shortness of breath.    Cardiovascular: Negative for chest pain.   Gastrointestinal: Positive for diarrhea, nausea and vomiting. Negative for abdominal distention, abdominal pain, blood in  stool and constipation.   Genitourinary: Negative for difficulty urinating, dysuria, flank pain, hematuria and urgency.       Objective:      Physical Exam   Constitutional: She is oriented to person, place, and time. Vital signs are normal. She appears well-developed and well-nourished. She is cooperative.   HENT:   Head: Normocephalic and atraumatic.   Right Ear: Hearing and external ear normal.   Left Ear: Hearing and external ear normal.   Nose: Nose normal.   Mouth/Throat: Mucous membranes are normal.   Eyes: Conjunctivae, EOM and lids are normal.   Neck: Normal range of motion. Neck supple.   Cardiovascular: Normal rate, regular rhythm, S1 normal, S2 normal, normal heart sounds and normal pulses.    Pulmonary/Chest: Effort normal and breath sounds normal. No respiratory distress.   Abdominal: Soft. Bowel sounds are normal. She exhibits no distension. There is no tenderness.   Musculoskeletal: Normal range of motion.   Neurological: She is alert and oriented to person, place, and time.   Skin: Skin is warm and dry. Capillary refill takes less than 2 seconds.   Psychiatric: She has a normal mood and affect. Her speech is normal and behavior is normal. Judgment and thought content normal.   Nursing note and vitals reviewed.      Assessment & Plan:       Nausea and vomiting, intractability of vomiting not specified, unspecified vomiting type  -     ondansetron (ZOFRAN-ODT) 8 MG TbDL; Take 1 tablet (8 mg total) by mouth every 6 (six) hours as needed.  Dispense: 12 tablet; Refill: 0    Diarrhea, unspecified type    Encouraged OTC rehydration fluids and stay well hydrated. Advance diet as tolerates.  Discouraged Imodium use at this time.   Use Zofran as prescribed.   RTC, if symptoms persist or worsen.       Follow-up if symptoms worsen or fail to improve.     Make appointment to establish care with PCP in [place].

## 2018-07-03 NOTE — LETTER
July 3, 2018      [facility]  [address] [place] [address]  Phone: [phone]  Fax: [phone]       Patient: Clyde Arellano   YOB: 1965  Date of Visit: 07/03/2018    To Whom It May Concern:    Cami Arellano  was at [facility] on 07/03/2018. She may return to work/school on 07/05/2018 with no restrictions. If you have any questions or concerns, or if I can be of further assistance, please do not hesitate to contact me.    Sincerely,    Ernst Demarco LPN

## 2018-07-03 NOTE — PATIENT INSTRUCTIONS

## 2019-08-15 ENCOUNTER — TELEPHONE (OUTPATIENT)
Dept: ADMINISTRATIVE | Facility: HOSPITAL | Age: 54
End: 2019-08-15

## 2021-12-02 ENCOUNTER — TELEPHONE (OUTPATIENT)
Dept: ADMINISTRATIVE | Facility: OTHER | Age: 56
End: 2021-12-02
Payer: COMMERCIAL

## 2022-06-23 ENCOUNTER — OFFICE VISIT (OUTPATIENT)
Dept: FAMILY MEDICINE | Facility: CLINIC | Age: 57
End: 2022-06-23
Payer: COMMERCIAL

## 2022-06-23 VITALS
WEIGHT: 184.06 LBS | DIASTOLIC BLOOD PRESSURE: 76 MMHG | OXYGEN SATURATION: 97 % | HEIGHT: 62 IN | HEART RATE: 70 BPM | SYSTOLIC BLOOD PRESSURE: 134 MMHG | BODY MASS INDEX: 33.87 KG/M2

## 2022-06-23 DIAGNOSIS — R51.9 HEAD ACHE: ICD-10-CM

## 2022-06-23 DIAGNOSIS — R05.9 COUGH: ICD-10-CM

## 2022-06-23 DIAGNOSIS — R68.83 CHILLS: ICD-10-CM

## 2022-06-23 PROCEDURE — U0005 INFEC AGEN DETEC AMPLI PROBE: HCPCS | Performed by: FAMILY MEDICINE

## 2022-06-23 PROCEDURE — 99213 OFFICE O/P EST LOW 20 MIN: CPT | Mod: PBBFAC,PO | Performed by: FAMILY MEDICINE

## 2022-06-23 PROCEDURE — 99213 PR OFFICE/OUTPT VISIT, EST, LEVL III, 20-29 MIN: ICD-10-PCS | Mod: S$GLB,,, | Performed by: FAMILY MEDICINE

## 2022-06-23 PROCEDURE — U0003 INFECTIOUS AGENT DETECTION BY NUCLEIC ACID (DNA OR RNA); SEVERE ACUTE RESPIRATORY SYNDROME CORONAVIRUS 2 (SARS-COV-2) (CORONAVIRUS DISEASE [COVID-19]), AMPLIFIED PROBE TECHNIQUE, MAKING USE OF HIGH THROUGHPUT TECHNOLOGIES AS DESCRIBED BY CMS-2020-01-R: HCPCS | Performed by: FAMILY MEDICINE

## 2022-06-23 PROCEDURE — 99999 PR PBB SHADOW E&M-EST. PATIENT-LVL III: ICD-10-PCS | Mod: PBBFAC,,, | Performed by: FAMILY MEDICINE

## 2022-06-23 PROCEDURE — 99213 OFFICE O/P EST LOW 20 MIN: CPT | Mod: S$GLB,,, | Performed by: FAMILY MEDICINE

## 2022-06-23 PROCEDURE — 99999 PR PBB SHADOW E&M-EST. PATIENT-LVL III: CPT | Mod: PBBFAC,,, | Performed by: FAMILY MEDICINE

## 2022-06-23 NOTE — PROGRESS NOTES
Subjective:   Patient ID: Clyde Arellano is a 57 y.o. female     Chief Complaint: URI    Here for checkup. Notes symptoms improving. Started 7 days ago.     Review of Systems   Constitutional: Negative for fever.   HENT: Positive for rhinorrhea and sinus pain.    Respiratory: Negative for cough and shortness of breath.    Cardiovascular: Negative for chest pain.   Gastrointestinal: Negative for abdominal pain.   Genitourinary: Negative for dysuria.     No past medical history on file.  Past Surgical History:   Procedure Laterality Date    DILATION AND CURETTAGE OF UTERUS      ELBOW FRACTURE SURGERY       Objective:     Vitals:    06/23/22 1129   BP: 134/76   Pulse: 70     Body mass index is 33.67 kg/m².  Physical Exam  Vitals and nursing note reviewed.   Constitutional:       Appearance: She is well-developed.   HENT:      Head: Normocephalic and atraumatic.   Eyes:      General: No scleral icterus.     Conjunctiva/sclera: Conjunctivae normal.   Pulmonary:      Effort: Pulmonary effort is normal. No respiratory distress.   Musculoskeletal:         General: No deformity. Normal range of motion.      Cervical back: Normal range of motion and neck supple.   Skin:     Coloration: Skin is not pale.      Findings: No rash.   Neurological:      Mental Status: She is alert and oriented to person, place, and time.   Psychiatric:         Behavior: Behavior normal.         Thought Content: Thought content normal.         Judgment: Judgment normal.       Assessment:     1. Cough    2. Chills    3. Head ache      Plan:   Cough  -     COVID-19 Routine Screening    Chills  -     COVID-19 Routine Screening    Head ache  -     COVID-19 Routine Screening      Had positive covid test at home but does not think test was very accurate. Not requesting any therapy. Would like accurate covid test to be performed.         Total time spent of Less than 30 minutes minutes on the day of the visit.This includes face to face time and  preparing to see the patient, obtaining and reviewing separately obtained history, documenting clinical information in the electronic or other health record, independently interpreting results and communicating results to the patient/family/caregiver, or care coordinator.    Davie Butterfield MD  06/23/2022    Portions of this note have been dictated with DELIA Winkler

## 2022-06-24 LAB — SARS-COV-2 RNA RESP QL NAA+PROBE: DETECTED

## 2022-12-13 ENCOUNTER — TELEPHONE (OUTPATIENT)
Dept: FAMILY MEDICINE | Facility: CLINIC | Age: 57
End: 2022-12-13
Payer: COMMERCIAL

## 2022-12-13 NOTE — TELEPHONE ENCOUNTER
----- Message from Laron Christian sent at 12/13/2022  9:13 AM CST -----      Name of Who is Calling:PT/          What is the request in detail:PT is requesting a call back to discuss a possible appointment on 1/12 she would be a new PT and would like to est care and discuss problems swallowing. PT  is a PT of Dr. Soares and would like for his wife to become a PT as well.          Can the clinic reply by [facility]:no          What Number to Call Back if not in [phone]

## 2022-12-13 NOTE — TELEPHONE ENCOUNTER
Called and spoke with pt. Offered pt appt for 1/12 at 10:20 pt accepted and verbalized understanding.

## 2023-01-12 ENCOUNTER — LAB VISIT (OUTPATIENT)
Dept: LAB | Facility: HOSPITAL | Age: 58
End: 2023-01-12
Attending: STUDENT IN AN ORGANIZED HEALTH CARE EDUCATION/TRAINING PROGRAM
Payer: COMMERCIAL

## 2023-01-12 ENCOUNTER — OFFICE VISIT (OUTPATIENT)
Dept: FAMILY MEDICINE | Facility: CLINIC | Age: 58
End: 2023-01-12
Payer: COMMERCIAL

## 2023-01-12 VITALS
RESPIRATION RATE: 15 BRPM | TEMPERATURE: 98 F | SYSTOLIC BLOOD PRESSURE: 130 MMHG | HEART RATE: 64 BPM | DIASTOLIC BLOOD PRESSURE: 66 MMHG | BODY MASS INDEX: 34.89 KG/M2 | HEIGHT: 62 IN | OXYGEN SATURATION: 99 % | WEIGHT: 189.63 LBS

## 2023-01-12 DIAGNOSIS — R53.83 FATIGUE, UNSPECIFIED TYPE: ICD-10-CM

## 2023-01-12 DIAGNOSIS — Z12.31 ENCOUNTER FOR SCREENING MAMMOGRAM FOR MALIGNANT NEOPLASM OF BREAST: ICD-10-CM

## 2023-01-12 DIAGNOSIS — R13.10 DYSPHAGIA, UNSPECIFIED TYPE: ICD-10-CM

## 2023-01-12 DIAGNOSIS — Z00.00 ENCOUNTER FOR PREVENTIVE HEALTH EXAMINATION: Primary | ICD-10-CM

## 2023-01-12 DIAGNOSIS — Z12.11 COLON CANCER SCREENING: ICD-10-CM

## 2023-01-12 DIAGNOSIS — Z00.00 ENCOUNTER FOR PREVENTIVE HEALTH EXAMINATION: ICD-10-CM

## 2023-01-12 DIAGNOSIS — Z11.59 ENCOUNTER FOR HCV SCREENING TEST FOR LOW RISK PATIENT: ICD-10-CM

## 2023-01-12 LAB
BASOPHILS # BLD AUTO: 0.01 K/UL (ref 0–0.2)
BASOPHILS NFR BLD: 0.2 % (ref 0–1.9)
DIFFERENTIAL METHOD: ABNORMAL
EOSINOPHIL # BLD AUTO: 0.1 K/UL (ref 0–0.5)
EOSINOPHIL NFR BLD: 1.3 % (ref 0–8)
ERYTHROCYTE [DISTWIDTH] IN BLOOD BY AUTOMATED COUNT: 11.9 % (ref 11.5–14.5)
ESTIMATED AVG GLUCOSE: 100 MG/DL (ref 68–131)
HBA1C MFR BLD: 5.1 % (ref 4–5.6)
HCT VFR BLD AUTO: 42.2 % (ref 37–48.5)
HGB BLD-MCNC: 14.2 G/DL (ref 12–16)
IMM GRANULOCYTES # BLD AUTO: 0.01 K/UL (ref 0–0.04)
IMM GRANULOCYTES NFR BLD AUTO: 0.2 % (ref 0–0.5)
LYMPHOCYTES # BLD AUTO: 1.4 K/UL (ref 1–4.8)
LYMPHOCYTES NFR BLD: 23.6 % (ref 18–48)
MCH RBC QN AUTO: 34 PG (ref 27–31)
MCHC RBC AUTO-ENTMCNC: 33.6 G/DL (ref 32–36)
MCV RBC AUTO: 101 FL (ref 82–98)
MONOCYTES # BLD AUTO: 0.7 K/UL (ref 0.3–1)
MONOCYTES NFR BLD: 10.9 % (ref 4–15)
NEUTROPHILS # BLD AUTO: 3.9 K/UL (ref 1.8–7.7)
NEUTROPHILS NFR BLD: 63.8 % (ref 38–73)
NRBC BLD-RTO: 0 /100 WBC
PLATELET # BLD AUTO: 283 K/UL (ref 150–450)
PMV BLD AUTO: 11.8 FL (ref 9.2–12.9)
RBC # BLD AUTO: 4.18 M/UL (ref 4–5.4)
WBC # BLD AUTO: 6.05 K/UL (ref 3.9–12.7)

## 2023-01-12 PROCEDURE — 3075F SYST BP GE 130 - 139MM HG: CPT | Mod: CPTII,S$GLB,, | Performed by: STUDENT IN AN ORGANIZED HEALTH CARE EDUCATION/TRAINING PROGRAM

## 2023-01-12 PROCEDURE — 3075F PR MOST RECENT SYSTOLIC BLOOD PRESS GE 130-139MM HG: ICD-10-PCS | Mod: CPTII,S$GLB,, | Performed by: STUDENT IN AN ORGANIZED HEALTH CARE EDUCATION/TRAINING PROGRAM

## 2023-01-12 PROCEDURE — 3078F DIAST BP <80 MM HG: CPT | Mod: CPTII,S$GLB,, | Performed by: STUDENT IN AN ORGANIZED HEALTH CARE EDUCATION/TRAINING PROGRAM

## 2023-01-12 PROCEDURE — 99999 PR PBB SHADOW E&M-EST. PATIENT-LVL IV: ICD-10-PCS | Mod: PBBFAC,,, | Performed by: STUDENT IN AN ORGANIZED HEALTH CARE EDUCATION/TRAINING PROGRAM

## 2023-01-12 PROCEDURE — 99999 PR PBB SHADOW E&M-EST. PATIENT-LVL IV: CPT | Mod: PBBFAC,,, | Performed by: STUDENT IN AN ORGANIZED HEALTH CARE EDUCATION/TRAINING PROGRAM

## 2023-01-12 PROCEDURE — 3008F PR BODY MASS INDEX (BMI) DOCUMENTED: ICD-10-PCS | Mod: CPTII,S$GLB,, | Performed by: STUDENT IN AN ORGANIZED HEALTH CARE EDUCATION/TRAINING PROGRAM

## 2023-01-12 PROCEDURE — 3078F PR MOST RECENT DIASTOLIC BLOOD PRESSURE < 80 MM HG: ICD-10-PCS | Mod: CPTII,S$GLB,, | Performed by: STUDENT IN AN ORGANIZED HEALTH CARE EDUCATION/TRAINING PROGRAM

## 2023-01-12 PROCEDURE — 80061 LIPID PANEL: CPT | Performed by: STUDENT IN AN ORGANIZED HEALTH CARE EDUCATION/TRAINING PROGRAM

## 2023-01-12 PROCEDURE — 86803 HEPATITIS C AB TEST: CPT | Performed by: STUDENT IN AN ORGANIZED HEALTH CARE EDUCATION/TRAINING PROGRAM

## 2023-01-12 PROCEDURE — 83036 HEMOGLOBIN GLYCOSYLATED A1C: CPT | Performed by: STUDENT IN AN ORGANIZED HEALTH CARE EDUCATION/TRAINING PROGRAM

## 2023-01-12 PROCEDURE — 82728 ASSAY OF FERRITIN: CPT | Performed by: STUDENT IN AN ORGANIZED HEALTH CARE EDUCATION/TRAINING PROGRAM

## 2023-01-12 PROCEDURE — 99396 PREV VISIT EST AGE 40-64: CPT | Mod: S$GLB,,, | Performed by: STUDENT IN AN ORGANIZED HEALTH CARE EDUCATION/TRAINING PROGRAM

## 2023-01-12 PROCEDURE — 84443 ASSAY THYROID STIM HORMONE: CPT | Performed by: STUDENT IN AN ORGANIZED HEALTH CARE EDUCATION/TRAINING PROGRAM

## 2023-01-12 PROCEDURE — 85025 COMPLETE CBC W/AUTO DIFF WBC: CPT | Performed by: STUDENT IN AN ORGANIZED HEALTH CARE EDUCATION/TRAINING PROGRAM

## 2023-01-12 PROCEDURE — 3008F BODY MASS INDEX DOCD: CPT | Mod: CPTII,S$GLB,, | Performed by: STUDENT IN AN ORGANIZED HEALTH CARE EDUCATION/TRAINING PROGRAM

## 2023-01-12 PROCEDURE — 36415 COLL VENOUS BLD VENIPUNCTURE: CPT | Mod: PO | Performed by: STUDENT IN AN ORGANIZED HEALTH CARE EDUCATION/TRAINING PROGRAM

## 2023-01-12 PROCEDURE — 99396 PR PREVENTIVE VISIT,EST,40-64: ICD-10-PCS | Mod: S$GLB,,, | Performed by: STUDENT IN AN ORGANIZED HEALTH CARE EDUCATION/TRAINING PROGRAM

## 2023-01-12 PROCEDURE — 86677 HELICOBACTER PYLORI ANTIBODY: CPT | Performed by: STUDENT IN AN ORGANIZED HEALTH CARE EDUCATION/TRAINING PROGRAM

## 2023-01-12 PROCEDURE — 1159F PR MEDICATION LIST DOCUMENTED IN MEDICAL RECORD: ICD-10-PCS | Mod: CPTII,S$GLB,, | Performed by: STUDENT IN AN ORGANIZED HEALTH CARE EDUCATION/TRAINING PROGRAM

## 2023-01-12 PROCEDURE — 80053 COMPREHEN METABOLIC PANEL: CPT | Performed by: STUDENT IN AN ORGANIZED HEALTH CARE EDUCATION/TRAINING PROGRAM

## 2023-01-12 PROCEDURE — 1159F MED LIST DOCD IN RCRD: CPT | Mod: CPTII,S$GLB,, | Performed by: STUDENT IN AN ORGANIZED HEALTH CARE EDUCATION/TRAINING PROGRAM

## 2023-01-12 NOTE — PROGRESS NOTES
[facility]    Subjective:    The HPI and pertinent ROS is included in the Diagnostic Impression Remarks section at the end of the note. Please see below for further details. Chief complaint is at end of note.     Clyde is a pleasant intelligent patient who is here for evaluation.     Modified Medications    No medications on file       Data reviewed 274}  Previous medical records reviewed and summarized in plan section at end of note.      If you are due for any health screening(s) below please notify me so we can arrange them to be ordered and scheduled. Most healthy patients at your age complete them, but you are free to accept or refuse. If you can't do it, I'll definitely understand. If you can, I'd certainly appreciate it!     Tests to Keep You Healthy    Mammogram: ORDERED BUT NOT SCHEDULED  Colon Cancer Screening: ORDERED  Cervical Cancer Screening: ORDERED      The following portions of the patient's history were reviewed and updated as appropriate: allergies, current medications, past family history, past medical history, past social history, past surgical history and problem list.    She  has no past medical history on file.  She  has a past surgical history that includes Elbow fracture surgery and Dilation and curettage of uterus.    She  reports that she has never smoked. She has never been exposed to tobacco smoke. She has never used smokeless tobacco. She reports current alcohol use. She reports that she does not use drugs.  She family history includes Cancer in her maternal grandfather and sister.    Review of patient's allergies indicates:  No Known Allergies    Tobacco Use: Low Risk     Smoking Tobacco Use: Never    Smokeless Tobacco Use: Never    Passive Exposure: Never     Physical Examination  General appearance: alert, cooperative, no distress  Neck: no thyromegaly, no neck stiffness  Lungs: clear to auscultation, no wheezes, rales or rhonchi, symmetric air entry  Heart:  "normal rate, regular rhythm, normal S1, S2, no murmurs, rubs, clicks or gallops  Abdomen: soft, nontender, nondistended, no rigidity, rebound, or guarding.   Back: no point tenderness over spine  Extremities: peripheral pulses normal, no unilateral leg swelling or calf tenderness   Neurological:alert, oriented, normal speech, no new focal findings or movement disorder noted from baseline    BP Readings from Last 3 Encounters:   01/12/23 130/66   06/23/22 134/76   07/03/18 110/70     Wt Readings from Last 3 Encounters:   01/12/23 86 kg (189 lb 9.5 oz)   06/23/22 83.5 kg (184 lb 1.4 oz)   07/03/18 86 kg (189 lb 9.5 oz)     /66 (BP Location: Right arm, Patient Position: Sitting, BP Method: Large (Manual))   Pulse 64   Temp 98.1 °F (36.7 °C) (Oral)   Resp 15   Ht 5' 2" (1.575 m)   Wt 86 kg (189 lb 9.5 oz)   LMP 01/01/2009   SpO2 99%   BMI 34.68 kg/m²    274}  Laboratory: I have reviewed old labs below:    274}    Lab Results   Component Value Date    WBC 4.39 10/13/2017    HGB 13.5 10/13/2017    HCT 40.0 10/13/2017    MCV 98 10/13/2017     10/13/2017     10/13/2017    K 4.2 10/13/2017     10/13/2017    CALCIUM 9.3 10/13/2017    CO2 22 (L) 10/13/2017    GLU 86 10/13/2017    BUN 19 10/13/2017    CREATININE 0.8 10/13/2017    ANIONGAP 13 10/13/2017    PROT 6.7 10/13/2017    ALBUMIN 3.6 10/13/2017    BILITOT 0.4 10/13/2017    ALKPHOS 75 10/13/2017    ALT 18 10/13/2017    AST 25 10/13/2017    CHOL 175 10/13/2017    TRIG 65 10/13/2017    HDL 46 10/13/2017    LDLCALC 116.0 10/13/2017    TSH 3.862 10/13/2017     Lab reviewed by me: Particular labs of significance that I will monitor, workup, or treat to improve are mentioned below in diagnostic impression remarks.    Imaging/EKG: I have reviewed the pertinent results and my findings are noted in remarks.  274}    CC:   Chief Complaint   Patient presents with    Establish Care    swalling difficulty        274}    Assessment/Plan  Clyde Arellano " "is a 57 y.o. female who presents to clinic with:  1. Encounter for preventive health examination    2. Dysphagia, unspecified type    3. Encounter for screening mammogram for malignant neoplasm of breast    4. Colon cancer screening    5. Fatigue, unspecified type    6. Encounter for HCV screening test for low risk patient       274}  Diagnostic Impression Remarks + HPI     Documentation entered by me for this encounter may have been done in part using speech-recognition technology. Although I have made an effort to ensure accuracy, "sound like" errors may exist and should be interpreted in context.     Dysphagia-present for years solids only only intermittently has some heartburn not very frequent she reports her mother had dilation of her esophagus.  No smoking history no unintentional weight loss no fever chills or night sweats.  No nausea vomiting.  Will get an esophagram recommended see GI for a scope.  Will get H pylori as well for reflux symptoms epigastric pain    Preventive-recommend to get a mammogram also recommended get a Pap smear and colon cancer screening no family history of colon cancer wants to do mail-in kit will send in Cologuard     Fatigue likely multifactorial will get blood work and follow-up on this    This is the extent of this pleasant patient's concerns at this present time. She did not feel chest pain upon exertion, dyspnea, nausea, vomiting, diaphoresis, or syncope. No pleuritic chest pain, unilateral leg swelling, calf tenderness, or calf pain. Negative for unintentional weight loss night sweats and fevers. Clyde will return to clinic in a few months for further workup and reassessment or sooner as needed. She was instructed to call the clinic or go to the emergency department or urgent care immediately if her symptoms do not improve, worsens, or if any new symptoms develop. As we discussed that symptoms could worsen over the next 24 hours she was advised that if any increased " swelling, pain, or numbness arise to go immediately to the ED. Patient knows to call any time if an emergency arises. Shared decision making occurred and she verbalized understanding in agreement with this plan. I discussed imaging findings, diagnosis, possibilities, treatment options, medications, risks, and benefits. She had many questions regarding the options and long-term effects. All questions were answered. She expressed understanding after counseling regarding the diagnosis and recommendations. She was capable and demonstrated competence with understanding of these options. Shared decision making was performed resulting in her choosing the current treatment plan. Patient handout was given with instructions and recommendations. Advised the patient that if they become pregnant to alert us immediately to assess for medication changes. I also discussed the importance of close follow up to discuss labs, change or modify her medications if needed, monitor side effects, and further evaluation of medical problems.     Additional workup planned: see labs ordered below.    See below for labs and meds ordered with associated diagnosis      1. Encounter for preventive health examination  - CBC Auto Differential; Future  - Comprehensive Metabolic Panel; Future  - Hemoglobin A1C; Future  - TSH; Future  - Lipid Panel; Future    2. Dysphagia, unspecified type  - Ambulatory referral/consult to Gastroenterology; Future  - FL Esophagram Complete; Future  - H. pylori Antibody, IgG; Future    3. Encounter for screening mammogram for malignant neoplasm of breast  - Mammo Digital Screening Bilat w/ Pito; Future    4. Colon cancer screening  - Cologuard Screening (Multitarget Stool DNA); Future  - Cologuard Screening (Multitarget Stool DNA)    5. Fatigue, unspecified type  - Ferritin; Future    6. Encounter for HCV screening test for low risk patient  - Hepatitis C Antibody; Future      Jj Soares MD   274}    If you are due  for any health screening(s) below please notify me so we can arrange them to be ordered and scheduled. Most healthy patients at your age complete them, but you are free to accept or refuse.     If you can't do it, I'll definitely understand. If you can, I'd certainly appreciate it!   Tests to Keep You Healthy    Mammogram: ORDERED BUT NOT SCHEDULED  Colon Cancer Screening: ORDERED  Cervical Cancer Screening: ORDERED

## 2023-01-13 ENCOUNTER — TELEPHONE (OUTPATIENT)
Dept: FAMILY MEDICINE | Facility: CLINIC | Age: 58
End: 2023-01-13
Payer: COMMERCIAL

## 2023-01-13 DIAGNOSIS — D53.9 MACROCYTIC ANEMIA: Primary | ICD-10-CM

## 2023-01-13 LAB
ALBUMIN SERPL BCP-MCNC: 4.3 G/DL (ref 3.5–5.2)
ALP SERPL-CCNC: 85 U/L (ref 55–135)
ALT SERPL W/O P-5'-P-CCNC: 33 U/L (ref 10–44)
ANION GAP SERPL CALC-SCNC: 10 MMOL/L (ref 8–16)
AST SERPL-CCNC: 28 U/L (ref 10–40)
BILIRUB SERPL-MCNC: 0.6 MG/DL (ref 0.1–1)
BUN SERPL-MCNC: 17 MG/DL (ref 6–20)
CALCIUM SERPL-MCNC: 10.2 MG/DL (ref 8.7–10.5)
CHLORIDE SERPL-SCNC: 102 MMOL/L (ref 95–110)
CHOLEST SERPL-MCNC: 231 MG/DL (ref 120–199)
CHOLEST/HDLC SERPL: 3.7 {RATIO} (ref 2–5)
CO2 SERPL-SCNC: 28 MMOL/L (ref 23–29)
CREAT SERPL-MCNC: 0.9 MG/DL (ref 0.5–1.4)
EST. GFR  (NO RACE VARIABLE): >60 ML/MIN/1.73 M^2
FERRITIN SERPL-MCNC: 142 NG/ML (ref 20–300)
GLUCOSE SERPL-MCNC: 71 MG/DL (ref 70–110)
H PYLORI IGG SERPL QL IA: NEGATIVE
HCV AB SERPL QL IA: NORMAL
HDLC SERPL-MCNC: 62 MG/DL (ref 40–75)
HDLC SERPL: 26.8 % (ref 20–50)
LDLC SERPL CALC-MCNC: 150 MG/DL (ref 63–159)
NONHDLC SERPL-MCNC: 169 MG/DL
POTASSIUM SERPL-SCNC: 4.6 MMOL/L (ref 3.5–5.1)
PROT SERPL-MCNC: 7.2 G/DL (ref 6–8.4)
SODIUM SERPL-SCNC: 140 MMOL/L (ref 136–145)
TRIGL SERPL-MCNC: 95 MG/DL (ref 30–150)
TSH SERPL DL<=0.005 MIU/L-ACNC: 2.45 UIU/ML (ref 0.4–4)

## 2023-01-18 ENCOUNTER — LAB VISIT (OUTPATIENT)
Dept: LAB | Facility: HOSPITAL | Age: 58
End: 2023-01-18
Attending: STUDENT IN AN ORGANIZED HEALTH CARE EDUCATION/TRAINING PROGRAM
Payer: COMMERCIAL

## 2023-01-18 DIAGNOSIS — D53.9 MACROCYTIC ANEMIA: ICD-10-CM

## 2023-01-18 PROCEDURE — 36415 COLL VENOUS BLD VENIPUNCTURE: CPT | Mod: PO | Performed by: STUDENT IN AN ORGANIZED HEALTH CARE EDUCATION/TRAINING PROGRAM

## 2023-01-18 PROCEDURE — 82607 VITAMIN B-12: CPT | Performed by: STUDENT IN AN ORGANIZED HEALTH CARE EDUCATION/TRAINING PROGRAM

## 2023-01-18 PROCEDURE — 82746 ASSAY OF FOLIC ACID SERUM: CPT | Performed by: STUDENT IN AN ORGANIZED HEALTH CARE EDUCATION/TRAINING PROGRAM

## 2023-01-19 LAB — FOLATE SERPL-MCNC: 17.3 NG/ML (ref 4–24)

## 2023-01-20 LAB — VIT B12 SERPL-MCNC: 814 NG/L (ref 180–914)

## 2023-01-26 ENCOUNTER — OFFICE VISIT (OUTPATIENT)
Dept: GASTROENTEROLOGY | Facility: CLINIC | Age: 58
End: 2023-01-26
Payer: COMMERCIAL

## 2023-01-26 VITALS
HEIGHT: 62 IN | HEART RATE: 65 BPM | DIASTOLIC BLOOD PRESSURE: 76 MMHG | BODY MASS INDEX: 35.54 KG/M2 | SYSTOLIC BLOOD PRESSURE: 128 MMHG | WEIGHT: 193.13 LBS

## 2023-01-26 DIAGNOSIS — R05.8 DRY COUGH: ICD-10-CM

## 2023-01-26 DIAGNOSIS — Z12.11 SCREENING FOR COLON CANCER: ICD-10-CM

## 2023-01-26 DIAGNOSIS — R07.89 OTHER CHEST PAIN: ICD-10-CM

## 2023-01-26 DIAGNOSIS — Z87.19 HISTORY OF HEMORRHOIDS: ICD-10-CM

## 2023-01-26 DIAGNOSIS — R13.10 ODYNOPHAGIA: ICD-10-CM

## 2023-01-26 DIAGNOSIS — R13.10 DYSPHAGIA, UNSPECIFIED TYPE: Primary | ICD-10-CM

## 2023-01-26 DIAGNOSIS — R53.82 CHRONIC FATIGUE: ICD-10-CM

## 2023-01-26 DIAGNOSIS — R12 HEARTBURN: ICD-10-CM

## 2023-01-26 PROCEDURE — 3044F PR MOST RECENT HEMOGLOBIN A1C LEVEL <7.0%: ICD-10-PCS | Mod: CPTII,S$GLB,,

## 2023-01-26 PROCEDURE — 3008F BODY MASS INDEX DOCD: CPT | Mod: CPTII,S$GLB,,

## 2023-01-26 PROCEDURE — 1160F RVW MEDS BY RX/DR IN RCRD: CPT | Mod: CPTII,S$GLB,,

## 2023-01-26 PROCEDURE — 99999 PR PBB SHADOW E&M-EST. PATIENT-LVL IV: CPT | Mod: PBBFAC,,,

## 2023-01-26 PROCEDURE — 3078F PR MOST RECENT DIASTOLIC BLOOD PRESSURE < 80 MM HG: ICD-10-PCS | Mod: CPTII,S$GLB,,

## 2023-01-26 PROCEDURE — 3074F PR MOST RECENT SYSTOLIC BLOOD PRESSURE < 130 MM HG: ICD-10-PCS | Mod: CPTII,S$GLB,,

## 2023-01-26 PROCEDURE — 3044F HG A1C LEVEL LT 7.0%: CPT | Mod: CPTII,S$GLB,,

## 2023-01-26 PROCEDURE — 99203 OFFICE O/P NEW LOW 30 MIN: CPT | Mod: S$GLB,,,

## 2023-01-26 PROCEDURE — 3078F DIAST BP <80 MM HG: CPT | Mod: CPTII,S$GLB,,

## 2023-01-26 PROCEDURE — 1160F PR REVIEW ALL MEDS BY PRESCRIBER/CLIN PHARMACIST DOCUMENTED: ICD-10-PCS | Mod: CPTII,S$GLB,,

## 2023-01-26 PROCEDURE — 1159F PR MEDICATION LIST DOCUMENTED IN MEDICAL RECORD: ICD-10-PCS | Mod: CPTII,S$GLB,,

## 2023-01-26 PROCEDURE — 99203 PR OFFICE/OUTPT VISIT, NEW, LEVL III, 30-44 MIN: ICD-10-PCS | Mod: S$GLB,,,

## 2023-01-26 PROCEDURE — 3008F PR BODY MASS INDEX (BMI) DOCUMENTED: ICD-10-PCS | Mod: CPTII,S$GLB,,

## 2023-01-26 PROCEDURE — 99999 PR PBB SHADOW E&M-EST. PATIENT-LVL IV: ICD-10-PCS | Mod: PBBFAC,,,

## 2023-01-26 PROCEDURE — 1159F MED LIST DOCD IN RCRD: CPT | Mod: CPTII,S$GLB,,

## 2023-01-26 PROCEDURE — 3074F SYST BP LT 130 MM HG: CPT | Mod: CPTII,S$GLB,,

## 2023-01-26 RX ORDER — PANTOPRAZOLE SODIUM 40 MG/1
40 TABLET, DELAYED RELEASE ORAL DAILY
Qty: 30 TABLET | Refills: 2 | Status: SHIPPED | OUTPATIENT
Start: 2023-01-26 | End: 2024-02-06

## 2023-01-26 NOTE — PROGRESS NOTES
Subjective:       Patient ID: Clyde Arellano is a 57 y.o. female Body mass index is 35.32 kg/m².    Chief Complaint: Dysphagia    This patient is new to me.  Referring Provider: Dr. Jj Soares for dysphagia.     Patient's daughter present during visit.    GI Problem  The primary symptoms include fatigue (chronic fatigue - recommended follow-up with PCP). Primary symptoms do not include fever, weight loss, abdominal pain, nausea, vomiting, diarrhea, melena, hematemesis, jaundice, hematochezia or dysuria.   The illness is also significant for dysphagia (Chronic issue that has recently worsened; feels as though solid food gets stuck in her chest every time she swallows as though a rock is sitting in her chest; drinking water after swallowing causes pain; Esophagram ordered by Dr. Soares) and odynophagia. The illness does not include chills, anorexia, bloating or constipation (Typically has 1 BM every other day to every 2 days; rated 3 on Potter scale). Associated symptoms comments: H. pylori antibody IgG negative 01/12/2023. Significant associated medical issues include GERD (20+ year issue, that has worsened; reports heartburn occurs daily - takes Tums p.r.n. with significant relief; reports symptoms are worsened with bending; takes goodies p.r.n. monthly for migraines) and hemorrhoids (Asymptomatic). Associated medical issues do not include inflammatory bowel disease, gallstones, liver disease, alcohol abuse, PUD, gastric bypass, bowel resection, irritable bowel syndrome or diverticulitis. Associated medical issues comments: Denies past history of colonoscopy, but recently submitted Cologuard stool test 01/12/2023 - results pending.     Review of Systems   Constitutional:  Positive for fatigue (chronic fatigue - recommended follow-up with PCP). Negative for activity change, appetite change, chills, diaphoresis, fever, unexpected weight change and weight loss.   HENT:  Positive for trouble swallowing.  Negative for sore throat and voice change.    Respiratory:  Positive for cough (Reports dry cough started the last few days). Negative for choking and shortness of breath.    Cardiovascular:  Positive for chest pain (Denies pain currently; patient reports chest pain occurs after swallowing certain items).   Gastrointestinal:  Positive for dysphagia (Chronic issue that has recently worsened; feels as though solid food gets stuck in her chest every time she swallows as though a rock is sitting in her chest; drinking water after swallowing causes pain; Esophagram ordered by Dr. Soares). Negative for abdominal distention, abdominal pain, anal bleeding, anorexia, bloating, blood in stool, constipation (Typically has 1 BM every other day to every 2 days; rated 3 on Lorain scale), diarrhea, hematemesis, hematochezia, jaundice, melena, nausea, rectal pain and vomiting.   Genitourinary:  Negative for dysuria.       Patient's last menstrual period was 01/01/2009.  History reviewed. No pertinent past medical history.  Past Surgical History:   Procedure Laterality Date    DILATION AND CURETTAGE OF UTERUS      ELBOW FRACTURE SURGERY       Family History   Problem Relation Age of Onset    Cancer Sister         lymph/kidney    Stomach cancer Maternal Grandfather         diagnosed in 40s    Cancer Maternal Grandfather         stomach    Esophageal cancer Neg Hx     Colon cancer Neg Hx     Colon polyps Neg Hx      Social History     Tobacco Use    Smoking status: Never     Passive exposure: Never    Smokeless tobacco: Never   Substance Use Topics    Alcohol use: Yes     Comment: occ    Drug use: No     Wt Readings from Last 10 Encounters:   01/26/23 87.6 kg (193 lb 2 oz)   01/12/23 86 kg (189 lb 9.5 oz)   06/23/22 83.5 kg (184 lb 1.4 oz)   07/03/18 86 kg (189 lb 9.5 oz)   11/07/17 82.6 kg (182 lb 1.6 oz)   11/07/17 82.6 kg (182 lb)   10/13/17 83 kg (182 lb 15.7 oz)   10/10/17 83 kg (183 lb)   02/05/14 82.1 kg (181 lb)   01/18/13  81.2 kg (179 lb)     Lab Results   Component Value Date    WBC 6.05 01/12/2023    HGB 14.2 01/12/2023    HCT 42.2 01/12/2023     (H) 01/12/2023     01/12/2023     CMP  Sodium   Date Value Ref Range Status   01/12/2023 140 136 - 145 mmol/L Final     Potassium   Date Value Ref Range Status   01/12/2023 4.6 3.5 - 5.1 mmol/L Final     Chloride   Date Value Ref Range Status   01/12/2023 102 95 - 110 mmol/L Final     CO2   Date Value Ref Range Status   01/12/2023 28 23 - 29 mmol/L Final     Glucose   Date Value Ref Range Status   01/12/2023 71 70 - 110 mg/dL Final     BUN   Date Value Ref Range Status   01/12/2023 17 6 - 20 mg/dL Final     Creatinine   Date Value Ref Range Status   01/12/2023 0.9 0.5 - 1.4 mg/dL Final     Calcium   Date Value Ref Range Status   01/12/2023 10.2 8.7 - 10.5 mg/dL Final     Total Protein   Date Value Ref Range Status   01/12/2023 7.2 6.0 - 8.4 g/dL Final     Albumin   Date Value Ref Range Status   01/12/2023 4.3 3.5 - 5.2 g/dL Final     Total Bilirubin   Date Value Ref Range Status   01/12/2023 0.6 0.1 - 1.0 mg/dL Final     Comment:     For infants and newborns, interpretation of results should be based  on gestational age, weight and in agreement with clinical  observations.    Premature Infant recommended reference ranges:  Up to 24 hours.............<8.0 mg/dL  Up to 48 hours............<12.0 mg/dL  3-5 days..................<15.0 mg/dL  6-29 days.................<15.0 mg/dL       Alkaline Phosphatase   Date Value Ref Range Status   01/12/2023 85 55 - 135 U/L Final     AST   Date Value Ref Range Status   01/12/2023 28 10 - 40 U/L Final     ALT   Date Value Ref Range Status   01/12/2023 33 10 - 44 U/L Final     Anion Gap   Date Value Ref Range Status   01/12/2023 10 8 - 16 mmol/L Final     eGFR if    Date Value Ref Range Status   10/13/2017 >60.0 >60 mL/min/1.73 m^2 Final     eGFR if non    Date Value Ref Range Status   10/13/2017 >60.0 >60  mL/min/1.73 m^2 Final     Comment:     Calculation used to obtain the estimated glomerular filtration  rate (eGFR) is the CKD-EPI equation. Since race is unknown   in our information system, the eGFR values for   -American and Non--American patients are given   for each creatinine result.       Lab Results   Component Value Date    TSH 2.447 01/12/2023     Lab Results   Component Value Date    FERRITIN 142 01/12/2023     Reviewed prior medical records including office visit with Dr. Soares 01/12/2023, radiology report of pelvic ultrasound 01/18/2013 & endoscopy history (see surgical history).    Objective:      Physical Exam  Vitals and nursing note reviewed.   Constitutional:       General: She is not in acute distress.     Appearance: Normal appearance. She is obese. She is not ill-appearing.   HENT:      Mouth/Throat:      Lips: Pink. No lesions.   Cardiovascular:      Rate and Rhythm: Normal rate and regular rhythm.      Pulses: Normal pulses.   Pulmonary:      Effort: Pulmonary effort is normal. No respiratory distress.   Abdominal:      General: Abdomen is protuberant. Bowel sounds are normal. There is no distension or abdominal bruit. There are no signs of injury.      Palpations: Abdomen is soft. There is no shifting dullness, fluid wave, hepatomegaly, splenomegaly or mass.      Tenderness: There is no abdominal tenderness. There is no guarding or rebound. Negative signs include Henry's sign, Rovsing's sign and McBurney's sign.      Hernia: No hernia is present.   Skin:     General: Skin is warm and dry.      Coloration: Skin is not jaundiced or pale.   Neurological:      Mental Status: She is alert and oriented to person, place, and time.   Psychiatric:         Attention and Perception: Attention normal.         Mood and Affect: Mood normal.         Speech: Speech normal.         Behavior: Behavior normal.       Assessment:       1. Dysphagia, unspecified type    2. Odynophagia    3.  Heartburn    4. Dry cough    5. Other chest pain    6. Chronic fatigue    7. History of hemorrhoids    8. Screening for colon cancer        Plan:       Dysphagia, unspecified type & Odynophagia  - schedule EGD, discussed procedure with patient and possible esophageal dilation may be performed during procedure if indicated, patient verbalized understanding  - educated patient to eat smaller more frequent meals and to eat slowly and advised to eat a soft diet.  - possible UGI/esophageal manometry if symptoms persist  -continue with ordered esophagram  - START: pantoprazole (PROTONIX) 40 MG tablet; Take 1 tablet (40 mg total) by mouth once daily.  Dispense: 30 tablet; Refill: 2    Heartburn  - schedule EGD, discussed procedure with patient, including risks and benefits, patient verbalized understanding  - avoid large meals, avoid eating within 2-3 hours of bedtime (avoid late night eating & lying down soon after eating), elevate head of bed if nocturnal symptoms are present, smoking cessation (if current smoker), & weight loss (if overweight).   -avoid known foods which trigger reflux symptoms & to minimize/avoid high-fat foods, chocolate, caffeine, citrus, alcohol, & tomato products.  -Advised to avoid/limit use of NSAID's, since they can cause GI upset, bleeding, and/or ulcers. If needed, take with food.   - START: pantoprazole (PROTONIX) 40 MG tablet; Take 1 tablet (40 mg total) by mouth once daily.  Dispense: 30 tablet; Refill: 2    Dry cough  - schedule EGD, discussed procedure with patient, including risks and benefits, patient verbalized understanding  -if symptoms continue or persist follow-up with PCP for continued evaluation and management    Other chest pain  - schedule EGD, discussed procedure with patient, including risks and benefits, patient verbalized understanding  -follow-up with PCP for continued evaluation and management, if pain worsens go to the ER    Chronic fatigue  -follow-up with PCP for  continued evaluation and management    History of hemorrhoids   -asymptomatic currently  - avoid constipation and straining with bowel movements    Screening for colon cancer   -Cologuard stool test pending  -Educated patient a colonoscopy is the best screening method to detect colorectal cancer    Follow up in about 4 weeks (around 2/23/2023), or if symptoms worsen or fail to improve.      If no improvement in symptoms or symptoms worsen, call/follow-up at clinic or go to ER.        30 minutes of total time spent on the encounter, which includes face to face time and non-face to face time preparing to see the patient (eg, review of tests), Obtaining and/or reviewing separately obtained history, Documenting clinical information in the electronic or other health record, Independently interpreting results (not separately reported) and communicating results to the patient/family/caregiver, or Care coordination (not separately reported).     A dictation software program was used for this note. Please expect some simple typographical  errors in this note.

## 2023-01-31 ENCOUNTER — TELEPHONE (OUTPATIENT)
Dept: FAMILY MEDICINE | Facility: CLINIC | Age: 58
End: 2023-01-31
Payer: COMMERCIAL

## 2023-01-31 LAB — NONINV COLON CA DNA+OCC BLD SCRN STL QL: NEGATIVE

## 2023-01-31 NOTE — TELEPHONE ENCOUNTER
----- Message from Jj Soares MD sent at 1/31/2023  8:06 AM CST -----  Please let patient know that his Cologuard mail-in kit was normal which is good news.  Recommend repeat in 3 years

## 2023-02-24 ENCOUNTER — TELEPHONE (OUTPATIENT)
Dept: FAMILY MEDICINE | Facility: CLINIC | Age: 58
End: 2023-02-24
Payer: COMMERCIAL

## 2023-02-24 ENCOUNTER — HOSPITAL ENCOUNTER (OUTPATIENT)
Dept: RADIOLOGY | Facility: HOSPITAL | Age: 58
Discharge: HOME | End: 2023-02-24
Attending: STUDENT IN AN ORGANIZED HEALTH CARE EDUCATION/TRAINING PROGRAM
Payer: COMMERCIAL

## 2023-02-24 DIAGNOSIS — Z12.31 ENCOUNTER FOR SCREENING MAMMOGRAM FOR MALIGNANT NEOPLASM OF BREAST: ICD-10-CM

## 2023-02-24 DIAGNOSIS — N63.10 MASS OF RIGHT BREAST, UNSPECIFIED QUADRANT: ICD-10-CM

## 2023-02-24 DIAGNOSIS — R92.2 INCONCLUSIVE MAMMOGRAM: Primary | ICD-10-CM

## 2023-02-24 PROCEDURE — 77067 SCR MAMMO BI INCL CAD: CPT | Mod: TC,PO

## 2023-02-27 ENCOUNTER — TELEPHONE (OUTPATIENT)
Dept: FAMILY MEDICINE | Facility: CLINIC | Age: 58
End: 2023-02-27
Payer: COMMERCIAL

## 2023-02-27 NOTE — TELEPHONE ENCOUNTER
----- Message from Wilfredo Lai sent at 2/27/2023  9:47 AM CST -----  Who Called: patient          Who Left Message for Patient: unsure          Does the patient know what this is regarding?: possible mammo results         Best Call Back Number: [phone]            Additional Information:

## 2023-03-06 ENCOUNTER — CLINICAL SUPPORT (OUTPATIENT)
Dept: AUDIOLOGY | Facility: CLINIC | Age: 58
End: 2023-03-06
Payer: COMMERCIAL

## 2023-03-06 ENCOUNTER — OFFICE VISIT (OUTPATIENT)
Dept: OTOLARYNGOLOGY | Facility: CLINIC | Age: 58
End: 2023-03-06
Payer: COMMERCIAL

## 2023-03-06 VITALS
DIASTOLIC BLOOD PRESSURE: 78 MMHG | BODY MASS INDEX: 35.24 KG/M2 | WEIGHT: 191.5 LBS | SYSTOLIC BLOOD PRESSURE: 132 MMHG | HEIGHT: 62 IN

## 2023-03-06 DIAGNOSIS — H90.3 SENSORINEURAL HEARING LOSS (SNHL) OF BOTH EARS: Primary | ICD-10-CM

## 2023-03-06 DIAGNOSIS — H93.13 TINNITUS OF BOTH EARS: ICD-10-CM

## 2023-03-06 DIAGNOSIS — H90.42 SENSORINEURAL HEARING LOSS (SNHL) OF LEFT EAR WITH UNRESTRICTED HEARING OF RIGHT EAR: ICD-10-CM

## 2023-03-06 DIAGNOSIS — H61.21 IMPACTED CERUMEN OF RIGHT EAR: Primary | ICD-10-CM

## 2023-03-06 PROCEDURE — 92557 PR COMPREHENSIVE HEARING TEST: ICD-10-PCS | Mod: S$GLB,,,

## 2023-03-06 PROCEDURE — 3044F HG A1C LEVEL LT 7.0%: CPT | Mod: CPTII,S$GLB,, | Performed by: OTOLARYNGOLOGY

## 2023-03-06 PROCEDURE — 99204 PR OFFICE/OUTPT VISIT, NEW, LEVL IV, 45-59 MIN: ICD-10-PCS | Mod: 25,S$GLB,, | Performed by: OTOLARYNGOLOGY

## 2023-03-06 PROCEDURE — 1159F MED LIST DOCD IN RCRD: CPT | Mod: CPTII,S$GLB,, | Performed by: OTOLARYNGOLOGY

## 2023-03-06 PROCEDURE — 92550 TYMPANOMETRY & REFLEX THRESH: CPT | Mod: S$GLB,,,

## 2023-03-06 PROCEDURE — 69210 PR REMOVAL IMPACTED CERUMEN REQUIRING INSTRUMENTATION, UNILATERAL: ICD-10-PCS | Mod: S$GLB,,, | Performed by: OTOLARYNGOLOGY

## 2023-03-06 PROCEDURE — 3008F BODY MASS INDEX DOCD: CPT | Mod: CPTII,S$GLB,, | Performed by: OTOLARYNGOLOGY

## 2023-03-06 PROCEDURE — 3078F PR MOST RECENT DIASTOLIC BLOOD PRESSURE < 80 MM HG: ICD-10-PCS | Mod: CPTII,S$GLB,, | Performed by: OTOLARYNGOLOGY

## 2023-03-06 PROCEDURE — 3044F PR MOST RECENT HEMOGLOBIN A1C LEVEL <7.0%: ICD-10-PCS | Mod: CPTII,S$GLB,, | Performed by: OTOLARYNGOLOGY

## 2023-03-06 PROCEDURE — 92557 COMPREHENSIVE HEARING TEST: CPT | Mod: S$GLB,,,

## 2023-03-06 PROCEDURE — 99204 OFFICE O/P NEW MOD 45 MIN: CPT | Mod: 25,S$GLB,, | Performed by: OTOLARYNGOLOGY

## 2023-03-06 PROCEDURE — 3075F SYST BP GE 130 - 139MM HG: CPT | Mod: CPTII,S$GLB,, | Performed by: OTOLARYNGOLOGY

## 2023-03-06 PROCEDURE — 3008F PR BODY MASS INDEX (BMI) DOCUMENTED: ICD-10-PCS | Mod: CPTII,S$GLB,, | Performed by: OTOLARYNGOLOGY

## 2023-03-06 PROCEDURE — 3075F PR MOST RECENT SYSTOLIC BLOOD PRESS GE 130-139MM HG: ICD-10-PCS | Mod: CPTII,S$GLB,, | Performed by: OTOLARYNGOLOGY

## 2023-03-06 PROCEDURE — 1159F PR MEDICATION LIST DOCUMENTED IN MEDICAL RECORD: ICD-10-PCS | Mod: CPTII,S$GLB,, | Performed by: OTOLARYNGOLOGY

## 2023-03-06 PROCEDURE — 3078F DIAST BP <80 MM HG: CPT | Mod: CPTII,S$GLB,, | Performed by: OTOLARYNGOLOGY

## 2023-03-06 PROCEDURE — 69210 REMOVE IMPACTED EAR WAX UNI: CPT | Mod: S$GLB,,, | Performed by: OTOLARYNGOLOGY

## 2023-03-06 PROCEDURE — 92550 PR TYMPANOMETRY AND REFLEX THRESHOLD MEASUREMENTS: ICD-10-PCS | Mod: S$GLB,,,

## 2023-03-06 NOTE — PROGRESS NOTES
Ms. Clyde Arellano, a 58 y.o. female, was seen in the clinic today for an audiological evaluation. Ms. Arellano's main complaint was aural fullness and hearing loss in her right ear. Ms. Arellano also reported intermittent bilateral tinnitus, worse in her right ear. Dr. Castellon removed occluding cerumen from patient's right ear prior to audiological evaluation.    Audiological testing revealed essentially normal hearing with a mild high freuqency sensorineural hearing loss at 2000 Hz and 8000 Hz bilaterally. Conductive component noted at 1000 Hz for the right ear. A speech reception threshold was obtained at 15 dBHL for the right ear and at 15 dBHL for the left ear.  Speech discrimination was 100% for the right ear and 100% for the left ear.      Tympanometry testing revealed Type A tympanograms bilaterally. Ipsilateral acoustic reflexes were present at 500, 1000, and 2000 Hz for both ears.    Recommendations:  1. Otologic evaluation  2. Annual audiological evaluation or sooner if hearing changes  3. Hearing protection when in noise   4. Utilize tinnitus management strategies discussed during appointment    Please click on link to view Audiogram:  Document on 3/6/2023 10:08 AM by BURKE Hardin: Audiogram

## 2023-03-06 NOTE — PROGRESS NOTES
OTOLARYNGOLOGY CLINIC NOTE  Date:  03/06/2023     Chief complaint:  Chief Complaint   Patient presents with    Cerumen Impaction     Was coming in for right ear canal feeling full, right ear full of wax. Small amount in left ear. Sometimes ears are itchy    Tinnitus     Static noise in right ear as well       History of Present Illness  Clyde Arellano is a 58 y.o. female  presenting today for a new evaluation and treatment of   Ear fullness and tinnitus    No ear drainage   Static sound in ear but has not had hearing test in many years - both ears but currently worse in right ear with impaction   + itching in the ear   Right ear fels swollen ; mostly right ear with tinnitus currently  + qtip usage    Past Medical History  No past medical history on file.     Past Surgical History  Past Surgical History:   Procedure Laterality Date    DILATION AND CURETTAGE OF UTERUS      ELBOW FRACTURE SURGERY          Medications  Current Outpatient Medications on File Prior to Visit   Medication Sig Dispense Refill    multivitamin capsule Take 1 capsule by mouth once daily.      pantoprazole (PROTONIX) 40 MG tablet Take 1 tablet (40 mg total) by mouth once daily. 30 tablet 2     No current facility-administered medications on file prior to visit.       Review of Systems  Review of Systems   Constitutional:  Positive for malaise/fatigue.   HENT:  Positive for hearing loss.    Eyes: Negative.    Cardiovascular: Negative.    Gastrointestinal:  Positive for heartburn.   Genitourinary: Negative.    Musculoskeletal:  Positive for back pain.   Skin: Negative.    Neurological:  Positive for dizziness.   Endo/Heme/Allergies:  Bruises/bleeds easily.   Psychiatric/Behavioral: Negative.      Answers submitted by the patient for this visit:  Review of Symptoms Questionnaire  (Submitted on 3/6/2023)  Snoring?: Yes  Acid Reflux?: Yes  None of these: Yes  Hot all of the time? : Yes  Light-headedness: Yes  Social History   reports that she has  "never smoked. She has never been exposed to tobacco smoke. She has never used smokeless tobacco. She reports current alcohol use. She reports that she does not use drugs.     Family History  Family History   Problem Relation Age of Onset    Cancer Sister         lymph/kidney    Stomach cancer Maternal Grandfather         diagnosed in 40s    Cancer Maternal Grandfather         stomach    Esophageal cancer Neg Hx     Colon cancer Neg Hx     Colon polyps Neg Hx         Physical Exam   Vitals:    03/06/23 0900   BP: 132/78    Body mass index is 35.02 kg/m².  Weight: 86.8 kg (191 lb 7.5 oz)   Height: 5' 2" (157.5 cm)     GENERAL: no acute distress.  HEAD: normocephalic.   EYES: lids and lashes normal. No scleral icterus  EARS: external ear without lesion, normal pinna shape and position.l;eft  External auditory canal with normal cerumen, tympanic membrane fully visible, no perforation , no retraction. No middle ear effusion. Ossicles intact. Right cerumen impaction   NOSE: external nose without significant bony abnormality  ORAL CAVITY/OROPHARYNX: tongue mobile.   NECK: trachea midline.   RESPIRATORY: no stridor, no stertor. Voice normal. Respirations nonlabored.  NEURO: alert, responds to questions appropriately.   PSYCH:mood appropriate    Procedure Note   Procedure performed: microscopic examination of ears with cerumen disimpaction    Indication for procedure: unilateral cerumen impaction     Description of procedure:  After verbal consent was obtained, the patient was positioned in semi recumbent position and speculum was placed in the right  ear and microscope brought into the field.  The microscope was positioned and magnification adjusted for appropriate visualization. Otologic instruments including various size otologic suctions and curette were used to remove the cerumen from the right external auditory canals under visualization with the operating microscope. After cleaning, visualization was again performed " and at various levels of higher magnification to optimize views of the ear canal, tympanic membrane, ossicles and middle ear space. Findings as indicated below. All portions of the procedure and examination by otomicroscopy were tolerated well without complication.     Findings:   Complete cerumen impaction removed entirely revealing normal external auditory canal; tympanic membrane without bulging, retraction, or perforation; no evidence of middle ear fluid or effusion.       AUDIOLOGY RESULTS  Audiometric evaluation including audiogram, tympanometry, acoustic reflexes, and speech discrimination which was performed  3-6-23 was personally reviewed and interpreted.  Notable findings on the audiogram were normal hearing on right with mild sensorineural hearing loss (SNHL) at 8 khz of left ear .  Tympanometry revealed Type A tympanogram on the left and Type A tympanogram on the right. Speech discrimination was 100%  on the left, and 100% on the right.     Report of the audiologist performing this audiometric testing was also reviewed   Imaging:  The patient does not have any pertinent and/or recent imaging of the head and neck.     Labs:  CBC  Recent Labs   Lab 01/12/23  1115   WBC 6.05   Hemoglobin 14.2   Hematocrit 42.2    H   Platelets 283     BMP  Recent Labs   Lab 01/12/23  1115   Glucose 71   Sodium 140   Potassium 4.6   Chloride 102   CO2 28   BUN 17   Creatinine 0.9   Calcium 10.2     COAGS        Assessment  1. Impacted cerumen of right ear    2. Tinnitus of both ears    3. Sensorineural hearing loss (SNHL) of left ear with unrestricted hearing of right ear       Plan:  Discussed plan of care with patient in detail and all questions answered. Patient reported understanding of plan of care.   Stop qtips  Reviewed hearing test results  Discussed pathophys of cerumen impaction and counseled about problems with qtip use  Alternatives such as hair dryer at low setting     May have some hidden hearing loss  with tinnitus, discussed other things that can cause tinnitus like caffeine and aspirin. No medication that helps with this. Hearing aid can be helpful so can get another hearing test in 1 year. Discussed slight asymmetry at left at 8 khz only- discussed extremely rare chance of acoustic neuroma that requires mri to rule out but may also be within standard deviation. Recommend 1 year hearing test and if worsens and/or if develops one sided symptoms on left to get mri     I spent a total of 45 minutes on the day of the visit.  This includes face to face time and non-face to face time preparing to see the patient (eg, review of tests), obtaining and/or reviewing separately obtained history, documenting clinical information in the electronic or other health record, independently interpreting results and communicating results to the patient/family/caregiver, or care coordinator.    Please be aware that this note has been generated with the assistance of immanuel voice-to-text.  Please excuse any spelling or grammatical errors.

## 2023-03-13 ENCOUNTER — HOSPITAL ENCOUNTER (OUTPATIENT)
Dept: RADIOLOGY | Facility: HOSPITAL | Age: 58
Discharge: HOME | End: 2023-03-13
Attending: STUDENT IN AN ORGANIZED HEALTH CARE EDUCATION/TRAINING PROGRAM
Payer: COMMERCIAL

## 2023-03-13 DIAGNOSIS — R92.2 INCONCLUSIVE MAMMOGRAM: ICD-10-CM

## 2023-03-13 DIAGNOSIS — N63.10 MASS OF RIGHT BREAST, UNSPECIFIED QUADRANT: ICD-10-CM

## 2023-03-13 PROCEDURE — 77061 MAMMO DIGITAL DIAGNOSTIC RIGHT WITH TOMO: ICD-10-PCS | Mod: 26,RT,, | Performed by: RADIOLOGY

## 2023-03-13 PROCEDURE — 77061 BREAST TOMOSYNTHESIS UNI: CPT | Mod: 26,RT,, | Performed by: RADIOLOGY

## 2023-03-13 PROCEDURE — 77065 DX MAMMO INCL CAD UNI: CPT | Mod: 26,RT,, | Performed by: RADIOLOGY

## 2023-03-13 PROCEDURE — 76642 ULTRASOUND BREAST LIMITED: CPT | Mod: TC,RT

## 2023-03-13 PROCEDURE — 77065 DX MAMMO INCL CAD UNI: CPT | Mod: TC,RT

## 2023-03-13 PROCEDURE — 77065 MAMMO DIGITAL DIAGNOSTIC RIGHT WITH TOMO: ICD-10-PCS | Mod: 26,RT,, | Performed by: RADIOLOGY

## 2023-03-13 PROCEDURE — 76642 US BREAST RIGHT LIMITED: ICD-10-PCS | Mod: 26,RT,, | Performed by: RADIOLOGY

## 2023-03-13 PROCEDURE — 76642 ULTRASOUND BREAST LIMITED: CPT | Mod: 26,RT,, | Performed by: RADIOLOGY

## 2023-03-17 ENCOUNTER — TELEPHONE (OUTPATIENT)
Dept: RADIOLOGY | Facility: HOSPITAL | Age: 58
End: 2023-03-17
Payer: COMMERCIAL

## 2023-03-17 ENCOUNTER — TELEPHONE (OUTPATIENT)
Dept: FAMILY MEDICINE | Facility: CLINIC | Age: 58
End: 2023-03-17

## 2023-03-17 DIAGNOSIS — N63.10 MASS OF RIGHT BREAST, UNSPECIFIED QUADRANT: Primary | ICD-10-CM

## 2023-03-17 NOTE — PROGRESS NOTES
Please let the patient know that there was a concerning breast mass in her right breast radiology wants to do a biopsy the radiologist reported that the breast care coordinator will set this up please confirm by this afternoon that they set this up thank you

## 2023-03-31 ENCOUNTER — ANESTHESIA EVENT (OUTPATIENT)
Dept: ENDOSCOPY | Facility: HOSPITAL | Age: 58
End: 2023-03-31
Payer: COMMERCIAL

## 2023-03-31 ENCOUNTER — HOSPITAL ENCOUNTER (OUTPATIENT)
Facility: HOSPITAL | Age: 58
Discharge: HOME | End: 2023-03-31
Attending: INTERNAL MEDICINE | Admitting: INTERNAL MEDICINE
Payer: COMMERCIAL

## 2023-03-31 ENCOUNTER — ANESTHESIA (OUTPATIENT)
Dept: ENDOSCOPY | Facility: HOSPITAL | Age: 58
End: 2023-03-31
Payer: COMMERCIAL

## 2023-03-31 DIAGNOSIS — K29.70 GASTRITIS, PRESENCE OF BLEEDING UNSPECIFIED, UNSPECIFIED CHRONICITY, UNSPECIFIED GASTRITIS TYPE: Primary | ICD-10-CM

## 2023-03-31 DIAGNOSIS — K44.9 HIATAL HERNIA: ICD-10-CM

## 2023-03-31 DIAGNOSIS — K22.2 SCHATZKI'S RING: ICD-10-CM

## 2023-03-31 DIAGNOSIS — R13.10 DYSPHAGIA: ICD-10-CM

## 2023-03-31 PROCEDURE — D9220A PRA ANESTHESIA: ICD-10-PCS | Mod: ANES,,, | Performed by: ANESTHESIOLOGY

## 2023-03-31 PROCEDURE — 88305 TISSUE EXAM BY PATHOLOGIST: ICD-10-PCS | Mod: 26,,, | Performed by: PATHOLOGY

## 2023-03-31 PROCEDURE — D9220A PRA ANESTHESIA: Mod: CRNA,,, | Performed by: STUDENT IN AN ORGANIZED HEALTH CARE EDUCATION/TRAINING PROGRAM

## 2023-03-31 PROCEDURE — 43248 PR EGD, FLEX, W/DILATION OVER GUIDEWIRE: ICD-10-PCS | Mod: ,,, | Performed by: INTERNAL MEDICINE

## 2023-03-31 PROCEDURE — D9220A PRA ANESTHESIA: Mod: ANES,,, | Performed by: ANESTHESIOLOGY

## 2023-03-31 PROCEDURE — 43239 PR EGD, FLEX, W/BIOPSY, SGL/MULTI: ICD-10-PCS | Mod: 59,,, | Performed by: INTERNAL MEDICINE

## 2023-03-31 PROCEDURE — 43239 EGD BIOPSY SINGLE/MULTIPLE: CPT | Mod: 59,,, | Performed by: INTERNAL MEDICINE

## 2023-03-31 PROCEDURE — 25000003 PHARM REV CODE 250: Performed by: INTERNAL MEDICINE

## 2023-03-31 PROCEDURE — D9220A PRA ANESTHESIA: ICD-10-PCS | Mod: CRNA,,, | Performed by: STUDENT IN AN ORGANIZED HEALTH CARE EDUCATION/TRAINING PROGRAM

## 2023-03-31 PROCEDURE — 63600175 PHARM REV CODE 636 W HCPCS: Performed by: STUDENT IN AN ORGANIZED HEALTH CARE EDUCATION/TRAINING PROGRAM

## 2023-03-31 PROCEDURE — 43248 EGD GUIDE WIRE INSERTION: CPT | Mod: ,,, | Performed by: INTERNAL MEDICINE

## 2023-03-31 PROCEDURE — 88342 CHG IMMUNOCYTOCHEMISTRY: ICD-10-PCS | Mod: 26,,, | Performed by: PATHOLOGY

## 2023-03-31 PROCEDURE — C1769 GUIDE WIRE: HCPCS | Performed by: INTERNAL MEDICINE

## 2023-03-31 PROCEDURE — 37000008 HC ANESTHESIA 1ST 15 MINUTES: Performed by: INTERNAL MEDICINE

## 2023-03-31 PROCEDURE — 43239 EGD BIOPSY SINGLE/MULTIPLE: CPT | Mod: 59 | Performed by: INTERNAL MEDICINE

## 2023-03-31 PROCEDURE — 27201012 HC FORCEPS, HOT/COLD, DISP: Performed by: INTERNAL MEDICINE

## 2023-03-31 PROCEDURE — 88305 TISSUE EXAM BY PATHOLOGIST: CPT | Performed by: PATHOLOGY

## 2023-03-31 PROCEDURE — 37000009 HC ANESTHESIA EA ADD 15 MINS: Performed by: INTERNAL MEDICINE

## 2023-03-31 PROCEDURE — 43248 EGD GUIDE WIRE INSERTION: CPT | Performed by: INTERNAL MEDICINE

## 2023-03-31 PROCEDURE — 88305 TISSUE EXAM BY PATHOLOGIST: CPT | Mod: 26,,, | Performed by: PATHOLOGY

## 2023-03-31 PROCEDURE — 25000003 PHARM REV CODE 250: Performed by: STUDENT IN AN ORGANIZED HEALTH CARE EDUCATION/TRAINING PROGRAM

## 2023-03-31 PROCEDURE — 88342 IMHCHEM/IMCYTCHM 1ST ANTB: CPT | Performed by: PATHOLOGY

## 2023-03-31 PROCEDURE — 88342 IMHCHEM/IMCYTCHM 1ST ANTB: CPT | Mod: 26,,, | Performed by: PATHOLOGY

## 2023-03-31 RX ORDER — PROPOFOL 10 MG/ML
VIAL (ML) INTRAVENOUS
Status: DISCONTINUED | OUTPATIENT
Start: 2023-03-31 | End: 2023-03-31

## 2023-03-31 RX ORDER — SODIUM CHLORIDE 9 MG/ML
INJECTION, SOLUTION INTRAVENOUS CONTINUOUS
Status: DISCONTINUED | OUTPATIENT
Start: 2023-03-31 | End: 2023-03-31 | Stop reason: HOSPADM

## 2023-03-31 RX ORDER — LIDOCAINE HYDROCHLORIDE 20 MG/ML
INJECTION INTRAVENOUS
Status: DISCONTINUED | OUTPATIENT
Start: 2023-03-31 | End: 2023-03-31

## 2023-03-31 RX ADMIN — PROPOFOL 100 MG: 10 INJECTION, EMULSION INTRAVENOUS at 10:03

## 2023-03-31 RX ADMIN — SODIUM CHLORIDE: 9 INJECTION, SOLUTION INTRAVENOUS at 10:03

## 2023-03-31 RX ADMIN — LIDOCAINE HYDROCHLORIDE 100 MG: 20 INJECTION, SOLUTION INTRAVENOUS at 10:03

## 2023-03-31 RX ADMIN — PROPOFOL 30 MG: 10 INJECTION, EMULSION INTRAVENOUS at 10:03

## 2023-03-31 RX ADMIN — PROPOFOL 20 MG: 10 INJECTION, EMULSION INTRAVENOUS at 10:03

## 2023-03-31 NOTE — PLAN OF CARE
Vss, gregor po fluids, denies pain, ambulates easily. IV removed, catheter intact. Discharge instructions provided and states understanding. States ready to go home.  Discharged from facility with family per wheelchair.

## 2023-03-31 NOTE — ANESTHESIA PREPROCEDURE EVALUATION
03/31/2023  Clyde Arellano is a 58 y.o., female.      Pre-op Assessment    I have reviewed the Patient Summary Reports.     I have reviewed the Nursing Notes. I have reviewed the NPO Status.   I have reviewed the Medications.     Review of Systems  Anesthesia Hx:  No problems with previous Anesthesia    Social:  Non-Smoker    Cardiovascular:   Denies Hypertension.  Denies MI.  Denies CAD.    Denies CABG/stent.   Denies Angina.    Pulmonary:   Denies COPD.  Denies Asthma.  Denies Recent URI.    Renal/:   Denies Chronic Renal Disease.     Hepatic/GI:   Denies GERD. Denies Liver Disease.    Neurological:   Denies TIA. Denies CVA. Denies Seizures.    Endocrine:   Denies Diabetes. Denies Hypothyroidism.    Psych:   Denies Psychiatric History.          Physical Exam  General: Well nourished, Cooperative, Alert and Oriented    Airway:  Mallampati: II / II  Mouth Opening: Normal  TM Distance: 4 - 6 cm  Tongue: Normal    Dental:  Intact    Chest/Lungs:  Clear to auscultation, Normal Respiratory Rate    Heart:  Rate: Normal  Rhythm: Regular Rhythm  Sounds: Normal        Anesthesia Plan  Type of Anesthesia, risks & benefits discussed:    Anesthesia Type: Gen Natural Airway  Intra-op Monitoring Plan: Standard ASA Monitors  Induction:  IV  Informed Consent: Informed consent signed with the Patient and all parties understand the risks and agree with anesthesia plan.  All questions answered.   ASA Score: 2    Ready For Surgery From Anesthesia Perspective.     .

## 2023-03-31 NOTE — H&P
CC: Dysphagia    58 year old female with above. States that symptoms are stable, no alleviating/exacerbating factors. No bleeding or weight loss.     ROS:  No headache, no fever/chills, no chest pain/SOB, no nausea/vomiting/diarrhea/constipation/GI bleeding/abdominal pain, no dysuria/hematuria.    VSSAF   Exam:   Alert and oriented x 3; no apparent distress   PERRLA, sclera anicteric  CV: Regular rate/rhythm, normal PMI   Lungs: Clear bilaterally with no wheeze/rales   Abdomen: Soft, NT/ND, normal bowel sounds   Ext: No cyanosis, clubbing     Impression:   As above    Plan:   Proceed with endoscopy. Further recs to follow.

## 2023-03-31 NOTE — TRANSFER OF CARE
"Anesthesia Transfer of Care Note    Patient: Clyde Arellano    Procedure(s) Performed: Procedure(s) (LRB):  EGD (ESOPHAGOGASTRODUODENOSCOPY) (N/A)    Patient location: GI    Anesthesia Type: general    Transport from OR: Transported from OR on room air with adequate spontaneous ventilation    Post pain: adequate analgesia    Post assessment: no apparent anesthetic complications    Post vital signs: stable    Level of consciousness: awake, lethargic and responds to stimulation    Nausea/Vomiting: no nausea/vomiting    Complications: none    Transfer of care protocol was followed      Last vitals:   Visit Vitals  BP (!) 143/69   Pulse (!) 58   Temp 36.7 °C (98.1 °F) (Skin)   Resp (!) 24   Ht 5' 3" (1.6 m)   Wt 87.1 kg (192 lb)   LMP 01/01/2009   SpO2 98%   Breastfeeding No   BMI 34.01 kg/m²     "

## 2023-03-31 NOTE — PROVATION PATIENT INSTRUCTIONS
Discharge Summary/Instructions after an Endoscopic Procedure  Patient Name: Clyde Arellano  Patient MRN: [id number]  Patient YOB: 1965  Friday, March 31, 2023  Stefano Loza MD  Dear patient,  As a result of recent federal legislation (The Federal Cures Act), you may   receive lab or pathology results from your procedure in your [facility]   account before your physician is able to contact you. Your physician or   their representative will relay the results to you with their   recommendations at their soonest availability.  Thank you,  RESTRICTIONS:  During your procedure today, you received medications for sedation.  These   medications may affect your judgment, balance and coordination.  Therefore,   for 24 hours, you have the following restrictions:   - DO NOT drive a car, operate machinery, make legal/financial decisions,   sign important papers or drink alcohol.    ACTIVITY:  Today: no heavy lifting, straining or running due to procedural   sedation/anesthesia.  The following day: return to full activity including work.  DIET:  Eat and drink normally unless instructed otherwise.     TREATMENT FOR COMMON SIDE EFFECTS:  - Mild abdominal pain, nausea, belching, bloating or excessive gas:  rest,   eat lightly and use a heating pad.  - Sore Throat: treat with throat lozenges and/or gargle with warm salt   water.  - Because air was used during the procedure, expelling large amounts of air   from your rectum or belching is normal.  - If a bowel prep was taken, you may not have a bowel movement for 1-3 days.    This is normal.  SYMPTOMS TO WATCH FOR AND REPORT TO YOUR PHYSICIAN:  1. Abdominal pain or bloating, other than gas cramps.  2. Chest pain.  3. Back pain.  4. Signs of infection such as: chills or fever occurring within 24 hours   after the procedure.  5. Rectal bleeding, which would show as bright red, maroon, or black stools.   (A tablespoon of blood from the rectum is not serious, especially if    hemorrhoids are present.)  6. Vomiting.  7. Weakness or dizziness.  GO DIRECTLY TO THE NEAREST EMERGENCY ROOM IF YOU HAVE ANY OF THE FOLLOWING:      Difficulty breathing              Chills and/or fever over 101 F   Persistent vomiting and/or vomiting blood   Severe abdominal pain   Severe chest pain   Black, tarry stools   Bleeding- more than one tablespoon   Any other symptom or condition that you feel may need urgent attention  Your doctor recommends these additional instructions:  If any biopsies were taken, your doctors clinic will contact you in 1 to 2   weeks with any results.  - Patient has a contact number available for emergencies.  The signs and   symptoms of potential delayed complications were discussed with the   patient.  Return to normal activities tomorrow.  Written discharge   instructions were provided to the patient.   - Resume previous diet.   - Continue present medications.   - No aspirin, ibuprofen, naproxen, or other non-steroidal anti-inflammatory   drugs.   - Await pathology results.   - Discharge patient to home (ambulatory).   - Follow an antireflux regimen.   - Return to GI office after studies are complete.  For questions, problems or results please call your physician - Stefano Loza MD at Work:  [phone].  [facility], EMERGENCY ROOM PHONE NUMBER: [phone]  IF A COMPLICATION OR EMERGENCY SITUATION ARISES AND YOU ARE UNABLE TO REACH   YOUR PHYSICIAN - GO DIRECTLY TO THE EMERGENCY ROOM.  Stefano Loza MD  3/31/2023 10:33:03 AM  This report has been verified and signed electronically.  Dear patient,  As a result of recent federal legislation (The Federal Cures Act), you may   receive lab or pathology results from your procedure in your [facility]   account before your physician is able to contact you. Your physician or   their representative will relay the results to you with their   recommendations at their soonest availability.  Thank you,  PROVATION

## 2023-04-03 VITALS
HEART RATE: 60 BPM | SYSTOLIC BLOOD PRESSURE: 118 MMHG | RESPIRATION RATE: 16 BRPM | TEMPERATURE: 98 F | BODY MASS INDEX: 34.02 KG/M2 | OXYGEN SATURATION: 96 % | HEIGHT: 63 IN | WEIGHT: 192 LBS | DIASTOLIC BLOOD PRESSURE: 67 MMHG

## 2023-04-03 NOTE — PROGRESS NOTES
Pleased with care given.  All staff were very kind and helpful.  Understands discharge instructions.

## 2023-04-04 ENCOUNTER — HOSPITAL ENCOUNTER (OUTPATIENT)
Dept: RADIOLOGY | Facility: HOSPITAL | Age: 58
Discharge: HOME | End: 2023-04-04
Attending: STUDENT IN AN ORGANIZED HEALTH CARE EDUCATION/TRAINING PROGRAM
Payer: COMMERCIAL

## 2023-04-04 DIAGNOSIS — R92.8 ABNORMAL MAMMOGRAM OF RIGHT BREAST: ICD-10-CM

## 2023-04-04 PROCEDURE — 77065 MAMMO DIGITAL DIAGNOSTIC RIGHT WITH TOMO: ICD-10-PCS | Mod: 26,RT,, | Performed by: RADIOLOGY

## 2023-04-04 PROCEDURE — 19083 BX BREAST 1ST LESION US IMAG: CPT | Mod: RT,,, | Performed by: RADIOLOGY

## 2023-04-04 PROCEDURE — 77061 BREAST TOMOSYNTHESIS UNI: CPT | Mod: TC,RT

## 2023-04-04 PROCEDURE — 77061 MAMMO DIGITAL DIAGNOSTIC RIGHT WITH TOMO: ICD-10-PCS | Mod: 26,RT,, | Performed by: RADIOLOGY

## 2023-04-04 PROCEDURE — 88305 TISSUE EXAM BY PATHOLOGIST: CPT | Mod: 26,,, | Performed by: PATHOLOGY

## 2023-04-04 PROCEDURE — 88305 TISSUE EXAM BY PATHOLOGIST: ICD-10-PCS | Mod: 26,,, | Performed by: PATHOLOGY

## 2023-04-04 PROCEDURE — 25000003 PHARM REV CODE 250: Performed by: STUDENT IN AN ORGANIZED HEALTH CARE EDUCATION/TRAINING PROGRAM

## 2023-04-04 PROCEDURE — 19083 BX BREAST 1ST LESION US IMAG: CPT | Mod: RT

## 2023-04-04 PROCEDURE — 19083 US BREAST BIOPSY WITH IMAGING 1ST SITE RIGHT: ICD-10-PCS | Mod: RT,,, | Performed by: RADIOLOGY

## 2023-04-04 PROCEDURE — 77065 DX MAMMO INCL CAD UNI: CPT | Mod: 26,RT,, | Performed by: RADIOLOGY

## 2023-04-04 PROCEDURE — 88305 TISSUE EXAM BY PATHOLOGIST: CPT | Performed by: PATHOLOGY

## 2023-04-04 PROCEDURE — 77061 BREAST TOMOSYNTHESIS UNI: CPT | Mod: 26,RT,, | Performed by: RADIOLOGY

## 2023-04-04 RX ORDER — LIDOCAINE HYDROCHLORIDE 10 MG/ML
1 INJECTION, SOLUTION EPIDURAL; INFILTRATION; INTRACAUDAL; PERINEURAL ONCE
Status: COMPLETED | OUTPATIENT
Start: 2023-04-04 | End: 2023-04-04

## 2023-04-04 RX ORDER — LIDOCAINE HYDROCHLORIDE 10 MG/ML
1 INJECTION, SOLUTION EPIDURAL; INFILTRATION; INTRACAUDAL; PERINEURAL ONCE
Status: CANCELLED | OUTPATIENT
Start: 2023-04-04 | End: 2023-04-04

## 2023-04-04 RX ADMIN — LIDOCAINE HYDROCHLORIDE 50 MG: 10 INJECTION, SOLUTION EPIDURAL; INFILTRATION; INTRACAUDAL at 09:04

## 2023-04-04 RX ADMIN — LIDOCAINE HYDROCHLORIDE 1 ML: 10; .005 INJECTION, SOLUTION EPIDURAL; INFILTRATION; INTRACAUDAL; PERINEURAL at 09:04

## 2023-04-05 ENCOUNTER — TELEPHONE (OUTPATIENT)
Dept: RADIOLOGY | Facility: HOSPITAL | Age: 58
End: 2023-04-05
Payer: COMMERCIAL

## 2023-04-06 ENCOUNTER — TELEPHONE (OUTPATIENT)
Dept: FAMILY MEDICINE | Facility: CLINIC | Age: 58
End: 2023-04-06
Payer: COMMERCIAL

## 2023-04-06 LAB
FINAL PATHOLOGIC DIAGNOSIS: NORMAL
GROSS: NORMAL
Lab: NORMAL

## 2023-04-06 NOTE — TELEPHONE ENCOUNTER
----- Message from Lila Laura sent at 4/6/2023  3:51 PM CDT -----  .Type:  Patient Call Back    Who Called: PT       Does the patient know what this is regarding?: MISSED CALL     Would the patient rather a call back YES     Best Call Back Number: [phone]    Additional Information: Thank You

## 2023-04-10 LAB
FINAL PATHOLOGIC DIAGNOSIS: NORMAL
GROSS: NORMAL
Lab: NORMAL
MICROSCOPIC EXAM: NORMAL

## 2023-06-14 ENCOUNTER — OFFICE VISIT (OUTPATIENT)
Dept: FAMILY MEDICINE | Facility: CLINIC | Age: 58
End: 2023-06-14
Payer: COMMERCIAL

## 2023-06-14 VITALS
OXYGEN SATURATION: 99 % | SYSTOLIC BLOOD PRESSURE: 126 MMHG | DIASTOLIC BLOOD PRESSURE: 68 MMHG | WEIGHT: 188.5 LBS | TEMPERATURE: 98 F | HEART RATE: 61 BPM | HEIGHT: 63 IN | BODY MASS INDEX: 33.4 KG/M2

## 2023-06-14 DIAGNOSIS — M67.449 DIGITAL MUCINOUS CYST OF FINGER: Primary | ICD-10-CM

## 2023-06-14 DIAGNOSIS — M67.449 GANGLION CYST OF FINGER: ICD-10-CM

## 2023-06-14 PROCEDURE — 3078F DIAST BP <80 MM HG: CPT | Mod: CPTII,S$GLB,, | Performed by: PHYSICIAN ASSISTANT

## 2023-06-14 PROCEDURE — 1159F MED LIST DOCD IN RCRD: CPT | Mod: CPTII,S$GLB,, | Performed by: PHYSICIAN ASSISTANT

## 2023-06-14 PROCEDURE — 1159F PR MEDICATION LIST DOCUMENTED IN MEDICAL RECORD: ICD-10-PCS | Mod: CPTII,S$GLB,, | Performed by: PHYSICIAN ASSISTANT

## 2023-06-14 PROCEDURE — 3044F PR MOST RECENT HEMOGLOBIN A1C LEVEL <7.0%: ICD-10-PCS | Mod: CPTII,S$GLB,, | Performed by: PHYSICIAN ASSISTANT

## 2023-06-14 PROCEDURE — 3078F PR MOST RECENT DIASTOLIC BLOOD PRESSURE < 80 MM HG: ICD-10-PCS | Mod: CPTII,S$GLB,, | Performed by: PHYSICIAN ASSISTANT

## 2023-06-14 PROCEDURE — 99213 PR OFFICE/OUTPT VISIT, EST, LEVL III, 20-29 MIN: ICD-10-PCS | Mod: S$GLB,,, | Performed by: PHYSICIAN ASSISTANT

## 2023-06-14 PROCEDURE — 3044F HG A1C LEVEL LT 7.0%: CPT | Mod: CPTII,S$GLB,, | Performed by: PHYSICIAN ASSISTANT

## 2023-06-14 PROCEDURE — 3008F PR BODY MASS INDEX (BMI) DOCUMENTED: ICD-10-PCS | Mod: CPTII,S$GLB,, | Performed by: PHYSICIAN ASSISTANT

## 2023-06-14 PROCEDURE — 3008F BODY MASS INDEX DOCD: CPT | Mod: CPTII,S$GLB,, | Performed by: PHYSICIAN ASSISTANT

## 2023-06-14 PROCEDURE — 99999 PR PBB SHADOW E&M-EST. PATIENT-LVL III: CPT | Mod: PBBFAC,,, | Performed by: PHYSICIAN ASSISTANT

## 2023-06-14 PROCEDURE — 99999 PR PBB SHADOW E&M-EST. PATIENT-LVL III: ICD-10-PCS | Mod: PBBFAC,,, | Performed by: PHYSICIAN ASSISTANT

## 2023-06-14 PROCEDURE — 99213 OFFICE O/P EST LOW 20 MIN: CPT | Mod: S$GLB,,, | Performed by: PHYSICIAN ASSISTANT

## 2023-06-14 PROCEDURE — 3074F SYST BP LT 130 MM HG: CPT | Mod: CPTII,S$GLB,, | Performed by: PHYSICIAN ASSISTANT

## 2023-06-14 PROCEDURE — 3074F PR MOST RECENT SYSTOLIC BLOOD PRESSURE < 130 MM HG: ICD-10-PCS | Mod: CPTII,S$GLB,, | Performed by: PHYSICIAN ASSISTANT

## 2023-06-14 NOTE — PROGRESS NOTES
"Subjective:       Patient ID: Clyde Arellano is a 58 y.o. female.    Chief Complaint: No chief complaint on file.    Patient presents for evaluation of a growth on the right index finger.  She noticed it about 2 months ago.  The growth is causing pain if she happens to bump the area.  She also has a nontender bump of the same joint that has been present for a longer period of time.  Patients patient medical/surgical, social and family histories have been reviewed       Review of Systems   Musculoskeletal:  Positive for arthralgias and joint swelling.   Skin:  Positive for wound.     Objective:      Physical Exam  Constitutional:       General: She is not in acute distress.     Appearance: Normal appearance. She is not ill-appearing.   HENT:      Head: Normocephalic and atraumatic.   Eyes:      Conjunctiva/sclera: Conjunctivae normal.   Pulmonary:      Effort: Pulmonary effort is normal.   Musculoskeletal:        Hands:    Neurological:      Mental Status: She is alert.   Psychiatric:         Mood and Affect: Mood normal.       Assessment:       1. Digital mucinous cyst of finger    2. Ganglion cyst of finger        Plan:       Diagnoses and all orders for this visit:    Digital mucinous cyst of finger  -     Ambulatory referral/consult to Orthopedics; Future    Ganglion cyst of finger  -     Ambulatory referral/consult to Orthopedics; Future           Follow up for ortho soon .           Documentation entered by me for this encounter may have been done in part using speech-recognition technology. Although I have made an effort to ensure accuracy, "sound like" errors may exist and should be interpreted in context.  "

## 2023-06-28 ENCOUNTER — TELEPHONE (OUTPATIENT)
Dept: ORTHOPEDICS | Facility: CLINIC | Age: 58
End: 2023-06-28
Payer: COMMERCIAL

## 2023-06-28 DIAGNOSIS — M79.641 RIGHT HAND PAIN: Primary | ICD-10-CM

## 2023-06-28 NOTE — TELEPHONE ENCOUNTER
Unable to LVM to infoorm of XR appt. No machine. No PPM.      Terri Rubio MA  Medical Assistant to Dr. Ross Dunbar [facility]

## 2023-06-29 ENCOUNTER — OFFICE VISIT (OUTPATIENT)
Dept: ORTHOPEDICS | Facility: CLINIC | Age: 58
End: 2023-06-29
Payer: COMMERCIAL

## 2023-06-29 ENCOUNTER — HOSPITAL ENCOUNTER (OUTPATIENT)
Dept: RADIOLOGY | Facility: HOSPITAL | Age: 58
Discharge: HOME | End: 2023-06-29
Attending: ORTHOPAEDIC SURGERY
Payer: COMMERCIAL

## 2023-06-29 VITALS — HEIGHT: 63 IN | WEIGHT: 188 LBS | BODY MASS INDEX: 33.31 KG/M2

## 2023-06-29 DIAGNOSIS — M79.641 RIGHT HAND PAIN: ICD-10-CM

## 2023-06-29 DIAGNOSIS — M67.449 GANGLION CYST OF FINGER: ICD-10-CM

## 2023-06-29 DIAGNOSIS — M67.449 DIGITAL MUCINOUS CYST OF FINGER: ICD-10-CM

## 2023-06-29 PROCEDURE — 3044F HG A1C LEVEL LT 7.0%: CPT | Mod: CPTII,S$GLB,, | Performed by: ORTHOPAEDIC SURGERY

## 2023-06-29 PROCEDURE — 3044F PR MOST RECENT HEMOGLOBIN A1C LEVEL <7.0%: ICD-10-PCS | Mod: CPTII,S$GLB,, | Performed by: ORTHOPAEDIC SURGERY

## 2023-06-29 PROCEDURE — 1159F PR MEDICATION LIST DOCUMENTED IN MEDICAL RECORD: ICD-10-PCS | Mod: CPTII,S$GLB,, | Performed by: ORTHOPAEDIC SURGERY

## 2023-06-29 PROCEDURE — 99204 PR OFFICE/OUTPT VISIT, NEW, LEVL IV, 45-59 MIN: ICD-10-PCS | Mod: S$GLB,,, | Performed by: ORTHOPAEDIC SURGERY

## 2023-06-29 PROCEDURE — 99204 OFFICE O/P NEW MOD 45 MIN: CPT | Mod: S$GLB,,, | Performed by: ORTHOPAEDIC SURGERY

## 2023-06-29 PROCEDURE — 99999 PR PBB SHADOW E&M-EST. PATIENT-LVL III: ICD-10-PCS | Mod: PBBFAC,,, | Performed by: ORTHOPAEDIC SURGERY

## 2023-06-29 PROCEDURE — 73130 X-RAY EXAM OF HAND: CPT | Mod: 26,RT,, | Performed by: RADIOLOGY

## 2023-06-29 PROCEDURE — 3008F PR BODY MASS INDEX (BMI) DOCUMENTED: ICD-10-PCS | Mod: CPTII,S$GLB,, | Performed by: ORTHOPAEDIC SURGERY

## 2023-06-29 PROCEDURE — 73130 X-RAY EXAM OF HAND: CPT | Mod: TC,PO,RT

## 2023-06-29 PROCEDURE — 3008F BODY MASS INDEX DOCD: CPT | Mod: CPTII,S$GLB,, | Performed by: ORTHOPAEDIC SURGERY

## 2023-06-29 PROCEDURE — 99999 PR PBB SHADOW E&M-EST. PATIENT-LVL III: CPT | Mod: PBBFAC,,, | Performed by: ORTHOPAEDIC SURGERY

## 2023-06-29 PROCEDURE — 1159F MED LIST DOCD IN RCRD: CPT | Mod: CPTII,S$GLB,, | Performed by: ORTHOPAEDIC SURGERY

## 2023-06-29 PROCEDURE — 73130 XR HAND COMPLETE 3 VIEW RIGHT: ICD-10-PCS | Mod: 26,RT,, | Performed by: RADIOLOGY

## 2023-06-29 RX ORDER — MUPIROCIN 20 MG/G
OINTMENT TOPICAL
Status: CANCELLED | OUTPATIENT
Start: 2023-06-29

## 2023-06-29 NOTE — H&P
[facility]  History & Physical  Orthopedics     SUBJECTIVE:       COVID-19 attestation:  This patient was treated during the COVID-19 pandemic.  This was discussed with the patient, they are aware of our current policies and procedures, were given the option of delaying their visit and or switching to a virtual visit, delaying their surgery when applicable, and they elect to proceed.     Chief Complaint:  Right index finger mucous cyst     Referring Provider: Lupe Daniel PA      History of Present Illness:  Patient is a 58 y.o. right hand dominant female who presents today with complaints of cyst to the right index finger D IP joint.  The patient notes this began atraumatically about 2 months ago.  It is quite painful.  It has spontaneously drained clear fluid.  She really is suffering with this and would like to discuss options today..      The patient is a/an works arouses.     Onset of symptoms/DOI was 2 months ago.     Symptoms are aggravated by activity and movement.     Symptoms are alleviated by rest.     Symptoms consist of pain and swelling.     The patient rates their pain as a 6/10.     Attempted treatment(s) and/or interventions include activity modifications, rest     The patient denies any fevers, chills, N/V, D/C and presents for evaluation.        No past medical history on file.        Past Surgical History:   Procedure Laterality Date    DILATION AND CURETTAGE OF UTERUS        ELBOW FRACTURE SURGERY        ESOPHAGOGASTRODUODENOSCOPY N/A 3/31/2023     Procedure: EGD (ESOPHAGOGASTRODUODENOSCOPY);  Surgeon: Stefano Robledo MD;  Location: [facility];  Service: Endoscopy;  Laterality: N/A;      Review of patient's allergies indicates:  No Known Allergies  Social History          Social History Narrative    Not on file            Family History   Problem Relation Age of Onset    Cancer Sister           lymph/kidney    Stomach cancer Maternal Grandfather           diagnosed  "in 40s    Cancer Maternal Grandfather           stomach    Esophageal cancer Neg Hx      Colon cancer Neg Hx      Colon polyps Neg Hx              Current Outpatient Medications:     multivitamin capsule, Take 1 capsule by mouth once daily., Disp: , Rfl:     pantoprazole (PROTONIX) 40 MG tablet, Take 1 tablet (40 mg total) by mouth once daily., Disp: 30 tablet, Rfl: 2        Review of Systems:  As per HPI otherwise noncontributory     OBJECTIVE:       Vital Signs (Most Recent):  Vitals       Vitals:     06/29/23 0942   Weight: 85.3 kg (188 lb)   Height: 5' 3" (1.6 m)         Body mass index is 33.3 kg/m².        Physical Exam:  Constitutional: The patient appears well-developed and well-nourished. No distress.   Skin: No lesions appreciated  Head: Normocephalic and atraumatic.   Nose: Nose normal.   Ears: No deformities seen  Eyes: Conjunctivae and EOM are normal.   Neck: No tracheal deviation present.   Cardiovascular: Normal rate and intact distal pulses.    Pulmonary/Chest: Effort normal. No respiratory distress.   Abdominal: There is no guarding.   Neurological: The patient is alert.   Psychiatric: The patient has a normal mood and affect.      Right Hand/Wrist Examination:     Observation/Inspection:  Swelling                       patient with mucous cyst right index finger D IP joint with very minimal and thin skin envelope coverage                   Deformity                     none  Discoloration               none                  Scars                           none                  Atrophy                        none     HAND/WRIST EXAMINATION:  Finkelstein's Test                                Neg  WHAT Test                                         Neg  Snuff box tenderness                          Neg  Herman's Test                                     Neg  Hook of Hamate Tenderness              Neg  CMC grind                                           Neg  Circumduction test                              " Neg     Neurovascular Exam:  Digits WWP, brisk CR < 3s throughout  NVI motor/LTS to M/R/U nerves, radial pulse 2+  Tinel's Test - Carpal Tunnel                Neg  Tinel's Test - Cubital Tunnel               Neg  Phalen's Test                                      Neg  Median Nerve Compression Test       Neg     ROM hand full, painless     ROM wrist full, painless    ROM elbow full, painless     Abdomen not guarded  Respirations nonlabored  Perfusion intact     Diagnostic Results:     Imaging - I independently viewed the patient's imaging as well as the radiology report.  Xrays of the patient's right hand  demonstrate no evidence of any acute fractures or dislocations with mild degenerative changes.     EMG - none     ASSESSMENT/PLAN:       58 y.o. yo female with right index finger mucous cyst  Plan: The patient and I had a thorough discussion today.  We discussed the working diagnosis as well as several other potential alternative diagnoses.  Treatment options were discussed, both conservative and surgical.  Conservative treatment options would include things such as activity modifications, workplace modifications, a period of rest, oral vs topical OTC and prescription anti-inflammatory medications, occupational therapy, splinting/bracing, immobilization, corticosteroid injections, and others.  Surgical options were discussed as well.      At this time, the patient would like to proceed with a trial of excisional biopsy right index finger mucous cyst with underlying D IP joint debridement on August 24, 2023 which I feel is reasonable.  I will get this set up.       The patient has not responded to adequate non operative treatment at this time and/or non operative treatment is not indicated. Thus, the risks, benefits and alternatives to surgery were discussed with the patient in detail.  Specific risks include but are not limited to bleeding, infection, vessel and/or nerve damage, pain, numbness, tingling,  compartment syndrome, need for additional surgery, failure to return to pre-injury and/or preoperative functional status, inability to return to work, scar sensitivity, delayed healing, complex regional pain syndrome, weakness, pulley injury, tendon injury, bowstringing, partial and/or incomplete relief of symptoms, persistence of and/or worsening of symptoms, hardware and/or surgical failure, prominent and/or symptomatic hardware possibly necessitating future removal, osteomyelitis, amputation, loss of function, stiffness, rotational malalignment, functional debility, dysfunction, decreased  strength, need for prolonged postoperative rehabilitation, malunion, nonunion, deep venous thrombosis, pulmonary embolism, arthritis and death.  The patient states an understanding and wishes to proceed with surgery.   All questions were answered.  No guarantees were implied or stated.  Written informed consent was obtained.          Rasta Perez M.D.     Please be aware that this note has been generated with the assistance of [employer] voice-to-text.  Please excuse any spelling or grammatical errors.     Thank you for choosing Dr. Rasta Perez for your orthopedic hand and upper extremity care. It is our goal to provide you with exceptional care that will help keep you healthy, active, and get you back in the game.     If you felt that you received exemplary care today, please consider leaving feedback for Dr. Perez on [employer] at [email or web address].     Please do not hesitate to reach out to us via email, phone, or MyChart with any questions, concerns, or feedback.

## 2023-06-29 NOTE — PROGRESS NOTES
[facility]  History & Physical  Orthopedics    SUBJECTIVE:      COVID-19 attestation:  This patient was treated during the COVID-19 pandemic.  This was discussed with the patient, they are aware of our current policies and procedures, were given the option of delaying their visit and or switching to a virtual visit, delaying their surgery when applicable, and they elect to proceed.    Chief Complaint:  Right index finger mucous cyst    Referring Provider: Lupe Daniel PA     History of Present Illness:  Patient is a 58 y.o. right hand dominant female who presents today with complaints of cyst to the right index finger D IP joint.  The patient notes this began atraumatically about 2 months ago.  It is quite painful.  It has spontaneously drained clear fluid.  She really is suffering with this and would like to discuss options today..     The patient is a/an works arouses.    Onset of symptoms/DOI was 2 months ago.    Symptoms are aggravated by activity and movement.    Symptoms are alleviated by rest.    Symptoms consist of pain and swelling.    The patient rates their pain as a 6/10.    Attempted treatment(s) and/or interventions include activity modifications, rest     The patient denies any fevers, chills, N/V, D/C and presents for evaluation.       No past medical history on file.  Past Surgical History:   Procedure Laterality Date    DILATION AND CURETTAGE OF UTERUS      ELBOW FRACTURE SURGERY      ESOPHAGOGASTRODUODENOSCOPY N/A 3/31/2023    Procedure: EGD (ESOPHAGOGASTRODUODENOSCOPY);  Surgeon: Stefano Robledo MD;  Location: [facility];  Service: Endoscopy;  Laterality: N/A;     Review of patient's allergies indicates:  No Known Allergies  Social History     Social History Narrative    Not on file     Family History   Problem Relation Age of Onset    Cancer Sister         lymph/kidney    Stomach cancer Maternal Grandfather         diagnosed in 40s    Cancer Maternal Grandfather   "       stomach    Esophageal cancer Neg Hx     Colon cancer Neg Hx     Colon polyps Neg Hx          Current Outpatient Medications:     multivitamin capsule, Take 1 capsule by mouth once daily., Disp: , Rfl:     pantoprazole (PROTONIX) 40 MG tablet, Take 1 tablet (40 mg total) by mouth once daily., Disp: 30 tablet, Rfl: 2      Review of Systems:  As per HPI otherwise noncontributory    OBJECTIVE:      Vital Signs (Most Recent):  Vitals:    06/29/23 0942   Weight: 85.3 kg (188 lb)   Height: 5' 3" (1.6 m)     Body mass index is 33.3 kg/m².      Physical Exam:  Constitutional: The patient appears well-developed and well-nourished. No distress.   Skin: No lesions appreciated  Head: Normocephalic and atraumatic.   Nose: Nose normal.   Ears: No deformities seen  Eyes: Conjunctivae and EOM are normal.   Neck: No tracheal deviation present.   Cardiovascular: Normal rate and intact distal pulses.    Pulmonary/Chest: Effort normal. No respiratory distress.   Abdominal: There is no guarding.   Neurological: The patient is alert.   Psychiatric: The patient has a normal mood and affect.     Right Hand/Wrist Examination:    Observation/Inspection:  Swelling  patient with mucous cyst right index finger D IP joint with very minimal and thin skin envelope coverage    Deformity  none  Discoloration  none     Scars   none    Atrophy  none    HAND/WRIST EXAMINATION:  Finkelstein's Test   Neg  WHAT Test    Neg  Snuff box tenderness   Neg  Herman's Test    Neg  Hook of Hamate Tenderness  Neg  CMC grind    Neg  Circumduction test   Neg    Neurovascular Exam:  Digits WWP, brisk CR < 3s throughout  NVI motor/LTS to M/R/U nerves, radial pulse 2+  Tinel's Test - Carpal Tunnel  Neg  Tinel's Test - Cubital Tunnel  Neg  Phalen's Test    Neg  Median Nerve Compression Test Neg    ROM hand full, painless    ROM wrist full, painless    ROM elbow full, painless    Abdomen not guarded  Respirations nonlabored  Perfusion intact    Diagnostic Results:   "   Imaging - I independently viewed the patient's imaging as well as the radiology report.  Xrays of the patient's right hand  demonstrate no evidence of any acute fractures or dislocations with mild degenerative changes.    EMG - none    ASSESSMENT/PLAN:      58 y.o. yo female with right index finger mucous cyst  Plan: The patient and I had a thorough discussion today.  We discussed the working diagnosis as well as several other potential alternative diagnoses.  Treatment options were discussed, both conservative and surgical.  Conservative treatment options would include things such as activity modifications, workplace modifications, a period of rest, oral vs topical OTC and prescription anti-inflammatory medications, occupational therapy, splinting/bracing, immobilization, corticosteroid injections, and others.  Surgical options were discussed as well.     At this time, the patient would like to proceed with a trial of excisional biopsy right index finger mucous cyst with underlying D IP joint debridement on August 24, 2023 which I feel is reasonable.  I will get this set up.      The patient has not responded to adequate non operative treatment at this time and/or non operative treatment is not indicated. Thus, the risks, benefits and alternatives to surgery were discussed with the patient in detail.  Specific risks include but are not limited to bleeding, infection, vessel and/or nerve damage, pain, numbness, tingling, compartment syndrome, need for additional surgery, failure to return to pre-injury and/or preoperative functional status, inability to return to work, scar sensitivity, delayed healing, complex regional pain syndrome, weakness, pulley injury, tendon injury, bowstringing, partial and/or incomplete relief of symptoms, persistence of and/or worsening of symptoms, hardware and/or surgical failure, prominent and/or symptomatic hardware possibly necessitating future removal, osteomyelitis, amputation,  loss of function, stiffness, rotational malalignment, functional debility, dysfunction, decreased  strength, need for prolonged postoperative rehabilitation, malunion, nonunion, deep venous thrombosis, pulmonary embolism, arthritis and death.  The patient states an understanding and wishes to proceed with surgery.   All questions were answered.  No guarantees were implied or stated.  Written informed consent was obtained.        Rasta Perez M.D.    Please be aware that this note has been generated with the assistance of [employer] voice-to-text.  Please excuse any spelling or grammatical errors.    Thank you for choosing Dr. Rasta Perez for your orthopedic hand and upper extremity care. It is our goal to provide you with exceptional care that will help keep you healthy, active, and get you back in the game.     If you felt that you received exemplary care today, please consider leaving feedback for Dr. Perez on [email or web address] at [email or web address].    Please do not hesitate to reach out to us via email, phone, or MyChart with any questions, concerns, or feedback.

## 2023-08-18 RX ORDER — FAMOTIDINE 10 MG/1
10 TABLET ORAL
COMMUNITY
End: 2024-02-06

## 2023-08-23 ENCOUNTER — ANESTHESIA EVENT (OUTPATIENT)
Dept: SURGERY | Facility: HOSPITAL | Age: 58
End: 2023-08-23
Payer: COMMERCIAL

## 2023-08-24 ENCOUNTER — HOSPITAL ENCOUNTER (OUTPATIENT)
Facility: HOSPITAL | Age: 58
Discharge: HOME | End: 2023-08-24
Attending: ORTHOPAEDIC SURGERY | Admitting: ORTHOPAEDIC SURGERY
Payer: COMMERCIAL

## 2023-08-24 ENCOUNTER — ANESTHESIA (OUTPATIENT)
Dept: SURGERY | Facility: HOSPITAL | Age: 58
End: 2023-08-24
Payer: COMMERCIAL

## 2023-08-24 DIAGNOSIS — M67.449 DIGITAL MUCINOUS CYST OF FINGER: ICD-10-CM

## 2023-08-24 DIAGNOSIS — Z01.818 PRE-OP TESTING: ICD-10-CM

## 2023-08-24 PROCEDURE — 36000706: Performed by: ORTHOPAEDIC SURGERY

## 2023-08-24 PROCEDURE — 71000033 HC RECOVERY, INTIAL HOUR: Performed by: ORTHOPAEDIC SURGERY

## 2023-08-24 PROCEDURE — 63600175 PHARM REV CODE 636 W HCPCS: Performed by: ORTHOPAEDIC SURGERY

## 2023-08-24 PROCEDURE — 37000008 HC ANESTHESIA 1ST 15 MINUTES: Performed by: ORTHOPAEDIC SURGERY

## 2023-08-24 PROCEDURE — D9220A PRA ANESTHESIA: ICD-10-PCS | Mod: ANES,,, | Performed by: ANESTHESIOLOGY

## 2023-08-24 PROCEDURE — 36000707: Performed by: ORTHOPAEDIC SURGERY

## 2023-08-24 PROCEDURE — 26115 PR EXC TUM/VASC MAL SFT TISS HAND/FNGR SUBQ <1.5CM: ICD-10-PCS | Mod: RT,,, | Performed by: ORTHOPAEDIC SURGERY

## 2023-08-24 PROCEDURE — 25000003 PHARM REV CODE 250: Performed by: ANESTHESIOLOGY

## 2023-08-24 PROCEDURE — 25000003 PHARM REV CODE 250: Performed by: ORTHOPAEDIC SURGERY

## 2023-08-24 PROCEDURE — 71000015 HC POSTOP RECOV 1ST HR: Performed by: ORTHOPAEDIC SURGERY

## 2023-08-24 PROCEDURE — D9220A PRA ANESTHESIA: ICD-10-PCS | Mod: CRNA,,, | Performed by: NURSE ANESTHETIST, CERTIFIED REGISTERED

## 2023-08-24 PROCEDURE — 25000003 PHARM REV CODE 250: Performed by: NURSE ANESTHETIST, CERTIFIED REGISTERED

## 2023-08-24 PROCEDURE — D9220A PRA ANESTHESIA: Mod: CRNA,,, | Performed by: NURSE ANESTHETIST, CERTIFIED REGISTERED

## 2023-08-24 PROCEDURE — 63600175 PHARM REV CODE 636 W HCPCS: Performed by: NURSE ANESTHETIST, CERTIFIED REGISTERED

## 2023-08-24 PROCEDURE — D9220A PRA ANESTHESIA: Mod: ANES,,, | Performed by: ANESTHESIOLOGY

## 2023-08-24 PROCEDURE — 37000009 HC ANESTHESIA EA ADD 15 MINS: Performed by: ORTHOPAEDIC SURGERY

## 2023-08-24 PROCEDURE — 26115 EXC HAND LES SC < 1.5 CM: CPT | Mod: RT,,, | Performed by: ORTHOPAEDIC SURGERY

## 2023-08-24 RX ORDER — OXYCODONE HYDROCHLORIDE 10 MG/1
10 TABLET ORAL EVERY 4 HOURS PRN
Status: DISCONTINUED | OUTPATIENT
Start: 2023-08-24 | End: 2023-08-24 | Stop reason: HOSPADM

## 2023-08-24 RX ORDER — ONDANSETRON 4 MG/1
8 TABLET, ORALLY DISINTEGRATING ORAL EVERY 8 HOURS PRN
Status: DISCONTINUED | OUTPATIENT
Start: 2023-08-24 | End: 2023-08-24 | Stop reason: HOSPADM

## 2023-08-24 RX ORDER — CEFAZOLIN SODIUM 2 G/50ML
2 SOLUTION INTRAVENOUS
Status: DISCONTINUED | OUTPATIENT
Start: 2023-08-24 | End: 2023-08-24 | Stop reason: HOSPADM

## 2023-08-24 RX ORDER — LIDOCAINE HYDROCHLORIDE 20 MG/ML
INJECTION INTRAVENOUS
Status: DISCONTINUED | OUTPATIENT
Start: 2023-08-24 | End: 2023-08-24

## 2023-08-24 RX ORDER — ACETAMINOPHEN 10 MG/ML
INJECTION, SOLUTION INTRAVENOUS
Status: DISCONTINUED | OUTPATIENT
Start: 2023-08-24 | End: 2023-08-24

## 2023-08-24 RX ORDER — HYDROMORPHONE HYDROCHLORIDE 2 MG/ML
0.5 INJECTION, SOLUTION INTRAMUSCULAR; INTRAVENOUS; SUBCUTANEOUS
Status: DISCONTINUED | OUTPATIENT
Start: 2023-08-24 | End: 2023-08-24 | Stop reason: HOSPADM

## 2023-08-24 RX ORDER — FENTANYL CITRATE 50 UG/ML
25 INJECTION, SOLUTION INTRAMUSCULAR; INTRAVENOUS EVERY 5 MIN PRN
Status: DISCONTINUED | OUTPATIENT
Start: 2023-08-24 | End: 2023-08-24 | Stop reason: HOSPADM

## 2023-08-24 RX ORDER — METOCLOPRAMIDE HYDROCHLORIDE 5 MG/ML
10 INJECTION INTRAMUSCULAR; INTRAVENOUS EVERY 10 MIN PRN
Status: DISCONTINUED | OUTPATIENT
Start: 2023-08-24 | End: 2023-08-24 | Stop reason: HOSPADM

## 2023-08-24 RX ORDER — PROPOFOL 10 MG/ML
VIAL (ML) INTRAVENOUS CONTINUOUS PRN
Status: DISCONTINUED | OUTPATIENT
Start: 2023-08-24 | End: 2023-08-24

## 2023-08-24 RX ORDER — DEXAMETHASONE SODIUM PHOSPHATE 4 MG/ML
INJECTION, SOLUTION INTRA-ARTICULAR; INTRALESIONAL; INTRAMUSCULAR; INTRAVENOUS; SOFT TISSUE
Status: DISCONTINUED | OUTPATIENT
Start: 2023-08-24 | End: 2023-08-24

## 2023-08-24 RX ORDER — LIDOCAINE HYDROCHLORIDE 10 MG/ML
1 INJECTION, SOLUTION EPIDURAL; INFILTRATION; INTRACAUDAL; PERINEURAL ONCE
Status: DISCONTINUED | OUTPATIENT
Start: 2023-08-24 | End: 2023-08-24 | Stop reason: HOSPADM

## 2023-08-24 RX ORDER — MIDAZOLAM HYDROCHLORIDE 1 MG/ML
INJECTION, SOLUTION INTRAMUSCULAR; INTRAVENOUS
Status: DISCONTINUED | OUTPATIENT
Start: 2023-08-24 | End: 2023-08-24

## 2023-08-24 RX ORDER — FENTANYL CITRATE 50 UG/ML
INJECTION, SOLUTION INTRAMUSCULAR; INTRAVENOUS
Status: DISCONTINUED | OUTPATIENT
Start: 2023-08-24 | End: 2023-08-24

## 2023-08-24 RX ORDER — TRAMADOL HYDROCHLORIDE 50 MG/1
50 TABLET ORAL EVERY 6 HOURS
Qty: 28 TABLET | Refills: 0 | Status: SHIPPED | OUTPATIENT
Start: 2023-08-24 | End: 2024-02-06

## 2023-08-24 RX ORDER — MUPIROCIN 20 MG/G
OINTMENT TOPICAL
Status: DISCONTINUED | OUTPATIENT
Start: 2023-08-24 | End: 2023-08-24 | Stop reason: HOSPADM

## 2023-08-24 RX ORDER — LIDOCAINE HYDROCHLORIDE 10 MG/ML
INJECTION, SOLUTION EPIDURAL; INFILTRATION; INTRACAUDAL; PERINEURAL
Status: DISCONTINUED | OUTPATIENT
Start: 2023-08-24 | End: 2023-08-24 | Stop reason: HOSPADM

## 2023-08-24 RX ORDER — OXYCODONE HYDROCHLORIDE 5 MG/1
5 TABLET ORAL ONCE AS NEEDED
Status: DISCONTINUED | OUTPATIENT
Start: 2023-08-24 | End: 2023-08-24 | Stop reason: HOSPADM

## 2023-08-24 RX ORDER — SODIUM CHLORIDE, SODIUM LACTATE, POTASSIUM CHLORIDE, CALCIUM CHLORIDE 600; 310; 30; 20 MG/100ML; MG/100ML; MG/100ML; MG/100ML
INJECTION, SOLUTION INTRAVENOUS CONTINUOUS
Status: DISCONTINUED | OUTPATIENT
Start: 2023-08-24 | End: 2023-08-24 | Stop reason: HOSPADM

## 2023-08-24 RX ORDER — ONDANSETRON 2 MG/ML
INJECTION INTRAMUSCULAR; INTRAVENOUS
Status: DISCONTINUED | OUTPATIENT
Start: 2023-08-24 | End: 2023-08-24

## 2023-08-24 RX ADMIN — FENTANYL CITRATE 25 MCG: 50 INJECTION, SOLUTION INTRAMUSCULAR; INTRAVENOUS at 08:08

## 2023-08-24 RX ADMIN — SODIUM CHLORIDE, SODIUM GLUCONATE, SODIUM ACETATE, POTASSIUM CHLORIDE AND MAGNESIUM CHLORIDE: 526; 502; 368; 37; 30 INJECTION, SOLUTION INTRAVENOUS at 06:08

## 2023-08-24 RX ADMIN — MUPIROCIN: 20 OINTMENT TOPICAL at 06:08

## 2023-08-24 RX ADMIN — CEFAZOLIN SODIUM 2 G: 2 SOLUTION INTRAVENOUS at 08:08

## 2023-08-24 RX ADMIN — ONDANSETRON 4 MG: 2 INJECTION INTRAMUSCULAR; INTRAVENOUS at 08:08

## 2023-08-24 RX ADMIN — FENTANYL CITRATE 50 MCG: 50 INJECTION, SOLUTION INTRAMUSCULAR; INTRAVENOUS at 08:08

## 2023-08-24 RX ADMIN — MIDAZOLAM 2 MG: 1 INJECTION INTRAMUSCULAR; INTRAVENOUS at 07:08

## 2023-08-24 RX ADMIN — LIDOCAINE HYDROCHLORIDE 75 MG: 20 INJECTION, SOLUTION INTRAVENOUS at 08:08

## 2023-08-24 RX ADMIN — ACETAMINOPHEN 1000 MG: 10 INJECTION, SOLUTION INTRAVENOUS at 08:08

## 2023-08-24 RX ADMIN — PROPOFOL 75 MCG/KG/MIN: 10 INJECTION, EMULSION INTRAVENOUS at 08:08

## 2023-08-24 RX ADMIN — DEXAMETHASONE SODIUM PHOSPHATE 4 MG: 4 INJECTION, SOLUTION INTRA-ARTICULAR; INTRALESIONAL; INTRAMUSCULAR; INTRAVENOUS; SOFT TISSUE at 08:08

## 2023-08-24 NOTE — BRIEF OP NOTE
[facility]  Brief Operative Note    Surgery Date: 8/24/2023     Surgeon(s) and Role:     * Rasta Perez MD - Primary    Assisting Surgeon: None    Pre-op Diagnosis:  Digital mucinous cyst of finger [M67.449]    Post-op Diagnosis:  Post-Op Diagnosis Codes:     * Digital mucinous cyst of finger [M67.449]    Procedure(s) (LRB):  EXCISION, CYST - RIF mucus cyst (Right)    Anesthesia: Local MAC    Operative Findings: see op note    Estimated Blood Loss: * No values recorded between 8/24/2023 12:00 AM and 8/24/2023  7:19 AM *         Specimens:   Specimen (24h ago, onward)      None              Discharge Note    OUTCOME: Patient tolerated treatment/procedure well without complication and is now ready for discharge.    DISPOSITION: Home or Self Care    FINAL DIAGNOSIS:  <principal problem not specified>    FOLLOWUP: In clinic    DISCHARGE INSTRUCTIONS:    Discharge Procedure Orders   Diet general     Leave dressing on - Keep it clean, dry, and intact until clinic visit     Call MD for:  temperature >100.4     Call MD for:  persistent nausea and vomiting     Call MD for:  severe uncontrolled pain     Call MD for:  difficulty breathing, headache or visual disturbances     Call MD for:  redness, tenderness, or signs of infection (pain, swelling, redness, odor or green/yellow discharge around incision site)     Call MD for:  hives     Call MD for:  persistent dizziness or light-headedness     Call MD for:  extreme fatigue

## 2023-08-24 NOTE — PLAN OF CARE
Discharge instructions given to pt and family/friend, verbalized understanding and questions answered. Handouts provided. Belongings given back to pt. IV removed- catheter intact. Discharge via wheelchair.

## 2023-08-24 NOTE — H&P
[facility]  History & Physical  Orthopedics     SUBJECTIVE:       COVID-19 attestation:  This patient was treated during the COVID-19 pandemic.  This was discussed with the patient, they are aware of our current policies and procedures, were given the option of delaying their visit and or switching to a virtual visit, delaying their surgery when applicable, and they elect to proceed.     Chief Complaint:  Right index finger mucous cyst     Referring Provider: Lupe Daniel PA      History of Present Illness:  Patient is a 58 y.o. right hand dominant female who presents today with complaints of cyst to the right index finger D IP joint.  The patient notes this began atraumatically about 2 months ago.  It is quite painful.  It has spontaneously drained clear fluid.  She really is suffering with this and would like to discuss options today..      The patient is a/an works arouses.     Onset of symptoms/DOI was 2 months ago.     Symptoms are aggravated by activity and movement.     Symptoms are alleviated by rest.     Symptoms consist of pain and swelling.     The patient rates their pain as a 6/10.     Attempted treatment(s) and/or interventions include activity modifications, rest     The patient denies any fevers, chills, N/V, D/C and presents for evaluation.        No past medical history on file.            Past Surgical History:   Procedure Laterality Date    DILATION AND CURETTAGE OF UTERUS        ELBOW FRACTURE SURGERY        ESOPHAGOGASTRODUODENOSCOPY N/A 3/31/2023     Procedure: EGD (ESOPHAGOGASTRODUODENOSCOPY);  Surgeon: Stefano Robledo MD;  Location: [facility];  Service: Endoscopy;  Laterality: N/A;      Review of patient's allergies indicates:  No Known Allergies  Social History            Social History Narrative    Not on file                Family History   Problem Relation Age of Onset    Cancer Sister           lymph/kidney    Stomach cancer Maternal Grandfather            "diagnosed in 40s    Cancer Maternal Grandfather           stomach    Esophageal cancer Neg Hx      Colon cancer Neg Hx      Colon polyps Neg Hx              Current Outpatient Medications:     multivitamin capsule, Take 1 capsule by mouth once daily., Disp: , Rfl:     pantoprazole (PROTONIX) 40 MG tablet, Take 1 tablet (40 mg total) by mouth once daily., Disp: 30 tablet, Rfl: 2        Review of Systems:  As per HPI otherwise noncontributory     OBJECTIVE:       Vital Signs (Most Recent):  Vitals         Vitals:     06/29/23 0942   Weight: 85.3 kg (188 lb)   Height: 5' 3" (1.6 m)         Body mass index is 33.3 kg/m².        Physical Exam:  Constitutional: The patient appears well-developed and well-nourished. No distress.   Skin: No lesions appreciated  Head: Normocephalic and atraumatic.   Nose: Nose normal.   Ears: No deformities seen  Eyes: Conjunctivae and EOM are normal.   Neck: No tracheal deviation present.   Cardiovascular: Normal rate and intact distal pulses.    Pulmonary/Chest: Effort normal. No respiratory distress.   Abdominal: There is no guarding.   Neurological: The patient is alert.   Psychiatric: The patient has a normal mood and affect.      Right Hand/Wrist Examination:     Observation/Inspection:  Swelling                       patient with mucous cyst right index finger D IP joint with very minimal and thin skin envelope coverage                   Deformity                     none  Discoloration               none                  Scars                           none                  Atrophy                        none     HAND/WRIST EXAMINATION:  Finkelstein's Test                                Neg  WHAT Test                                         Neg  Snuff box tenderness                          Neg  Herman's Test                                     Neg  Hook of Hamate Tenderness              Neg  CMC grind                                           Neg  Circumduction test                  "             Neg     Neurovascular Exam:  Digits WWP, brisk CR < 3s throughout  NVI motor/LTS to M/R/U nerves, radial pulse 2+  Tinel's Test - Carpal Tunnel                Neg  Tinel's Test - Cubital Tunnel               Neg  Phalen's Test                                      Neg  Median Nerve Compression Test       Neg     ROM hand full, painless     ROM wrist full, painless    ROM elbow full, painless     Abdomen not guarded  Respirations nonlabored  Perfusion intact     Diagnostic Results:     Imaging - I independently viewed the patient's imaging as well as the radiology report.  Xrays of the patient's right hand  demonstrate no evidence of any acute fractures or dislocations with mild degenerative changes.     EMG - none     ASSESSMENT/PLAN:       58 y.o. yo female with right index finger mucous cyst  Plan: The patient and I had a thorough discussion today.  We discussed the working diagnosis as well as several other potential alternative diagnoses.  Treatment options were discussed, both conservative and surgical.  Conservative treatment options would include things such as activity modifications, workplace modifications, a period of rest, oral vs topical OTC and prescription anti-inflammatory medications, occupational therapy, splinting/bracing, immobilization, corticosteroid injections, and others.  Surgical options were discussed as well.      At this time, the patient would like to proceed with a trial of excisional biopsy right index finger mucous cyst with underlying D IP joint debridement on August 24, 2023 which I feel is reasonable.  I will get this set up.       The patient has not responded to adequate non operative treatment at this time and/or non operative treatment is not indicated. Thus, the risks, benefits and alternatives to surgery were discussed with the patient in detail.  Specific risks include but are not limited to bleeding, infection, vessel and/or nerve damage, pain, numbness,  tingling, compartment syndrome, need for additional surgery, failure to return to pre-injury and/or preoperative functional status, inability to return to work, scar sensitivity, delayed healing, complex regional pain syndrome, weakness, pulley injury, tendon injury, bowstringing, partial and/or incomplete relief of symptoms, persistence of and/or worsening of symptoms, hardware and/or surgical failure, prominent and/or symptomatic hardware possibly necessitating future removal, osteomyelitis, amputation, loss of function, stiffness, rotational malalignment, functional debility, dysfunction, decreased  strength, need for prolonged postoperative rehabilitation, malunion, nonunion, deep venous thrombosis, pulmonary embolism, arthritis and death.  The patient states an understanding and wishes to proceed with surgery.   All questions were answered.  No guarantees were implied or stated.  Written informed consent was obtained.          Rasta Perez M.D.     Please be aware that this note has been generated with the assistance of [employer] voice-to-text.  Please excuse any spelling or grammatical errors.     Thank you for choosing Dr. Rasta Perez for your orthopedic hand and upper extremity care. It is our goal to provide you with exceptional care that will help keep you healthy, active, and get you back in the game.     If you felt that you received exemplary care today, please consider leaving feedback for Dr. Perez on [employer] at [email or web address].     Please do not hesitate to reach out to us via email, phone, or MyChart with any questions, concerns, or feedback.

## 2023-08-24 NOTE — TRANSFER OF CARE
"Anesthesia Transfer of Care Note    Patient: Clyde Arellano    Procedure(s) Performed: Procedure(s) (LRB):  EXCISION, CYST - RIF mucus cyst (Right)    Patient location: PACU    Anesthesia Type: MAC    Transport from OR: Transported from OR on 2-3 L/min O2 by NC with adequate spontaneous ventilation    Post pain: adequate analgesia    Post assessment: no apparent anesthetic complications    Post vital signs: stable    Level of consciousness: awake    Nausea/Vomiting: no nausea/vomiting    Complications: none    Transfer of care protocol was followed      Last vitals:   Visit Vitals  BP (!) 167/73 (BP Location: Left arm, Patient Position: Lying)   Pulse (!) 56   Temp 36.4 °C (97.6 °F) (Oral)   Resp 16   Ht 5' 3" (1.6 m)   Wt 85.3 kg (188 lb)   LMP 01/01/2009   SpO2 99%   Breastfeeding No   BMI 33.30 kg/m²     "

## 2023-08-24 NOTE — PLAN OF CARE
Pt prepped for surgery. Consents at bedside. Incentive spirometry refused by pt. Pt belongings given to pt's .  set up with text alerts. Unable to remove wedding ring. Taped with coban.

## 2023-08-24 NOTE — ANESTHESIA POSTPROCEDURE EVALUATION
Anesthesia Post Evaluation    Patient: Clyde Arellano    Procedure(s) Performed: Procedure(s) (LRB):  EXCISION, CYST - RIF mucus cyst (Right)    Final Anesthesia Type: MAC      Patient location during evaluation: PACU  Patient participation: Yes- Able to Participate  Level of consciousness: awake and alert and oriented  Post-procedure vital signs: reviewed and stable  Pain management: adequate  Airway patency: patent    PONV status at discharge: No PONV  Anesthetic complications: no      Cardiovascular status: blood pressure returned to baseline and stable  Respiratory status: unassisted and spontaneous ventilation  Hydration status: euvolemic  Follow-up not needed.          Vitals Value Taken Time   /64 08/24/23 0945   Temp 36.3 °C (97.3 °F) 08/24/23 0930   Pulse 49 08/24/23 0945   Resp 18 08/24/23 0945   SpO2 100 % 08/24/23 0945         Event Time   Out of Recovery 09:31:00         Pain/Paula Score: Paula Score: 10 (8/24/2023  9:25 AM)  Modified Paula Score: 20 (8/24/2023  9:45 AM)

## 2023-08-24 NOTE — ANESTHESIA PREPROCEDURE EVALUATION
08/24/2023  Clyde Arellano is a 58 y.o., female.      Pre-op Assessment    I have reviewed the Patient Summary Reports.     I have reviewed the Nursing Notes. I have reviewed the NPO Status.   I have reviewed the Medications.     Review of Systems  Anesthesia Hx:  No problems with previous Anesthesia    Social:  Non-Smoker    Cardiovascular:  Cardiovascular Normal     Pulmonary:  Pulmonary Normal    Renal/:  Renal/ Normal     Hepatic/GI:   GERD, well controlled    Neurological:  Neurology Normal    Endocrine:  Endocrine Normal        Physical Exam  General: Well nourished, Cooperative, Alert and Oriented    Airway:  Mallampati: II   Mouth Opening: Normal  TM Distance: Normal  Neck ROM: Normal ROM        Anesthesia Plan  Type of Anesthesia, risks & benefits discussed:    Anesthesia Type: Gen ETT, Gen Supraglottic Airway, Gen Natural Airway, MAC  Intra-op Monitoring Plan: Standard ASA Monitors  Post Op Pain Control Plan: multimodal analgesia  Induction:  IV  Airway Plan: Direct, Video and Fiberoptic, Post-Induction  Informed Consent: Informed consent signed with the Patient and all parties understand the risks and agree with anesthesia plan.  All questions answered.   ASA Score: 2    Ready For Surgery From Anesthesia Perspective.     .

## 2023-08-24 NOTE — DISCHARGE INSTRUCTIONS
Post op instructions for prevention of DVT  What is deep vein thrombosis?  Deep vein thrombosis (DVT) is the medical term for blood clots in the deep veins of the leg.  These blood clots can be dangerous.  A DVT can block a blood vessel and keep blood from getting where it needs to go.  Another problem is that the clot can travel to other parts of the body such as the lungs.  A clot that travels to the lungs is called a pulmonary embolus (PE) and can cause serious problems with breathing which can lead to death.  Am I at risk for DVT/PE?  If you are not very active, you are at risk of DVT.  Anyone confined to bed, sitting for long periods of time, recovering from surgery, etc. increases the risk of DVT.  Other risk factors are cancer diagnosis, certain medications, estrogen replacement in any form,older age, obesity, pregnancy, smoking, history of clotting disorders, and dehydration.  How will I know if I have a DVT?  Swelling in the lower leg  Pain  Warmth, redness, hardness or bulging of the vein  If you have any of these symptoms, call your doctors office right away.  Some people will not have any symptoms until the clot moves to the lungs.  What are the symptoms of a PE?  Panting, shortness of breath, or trouble breathing  Sharp, knife-like chest pain when you breathe  Coughing or coughing up blood  Rapid heartbeat  If you have any of these symptoms or get worse quickly, call 911 for emergency treatment.  How can I prevent a DVT?  Avoid long periods of inactivity and dont cross your legs--get up and walk around every hour or so.  Stay active--walking after surgery is highly encouraged.  This means you should get out of the house and walk in the neighborhood.  Going up and down stairs will not impair healing (unless advised against such activity by your doctor).    Drink plenty of noncaffeinated, nonalcoholic fluids each day to prevent dehydration.  Wear special support stockings, if they have been advised by  "your doctor.  If you travel, stop at least once an hour and walk around.  Avoid smoking (assistance with stopping is available through your healthcare provider)  Always notify your doctor if you are not able to follow the post operative instructions that are given to you at the time of discharge.  It may be necessary to prescribe one of the medications available to prevent DVT. We hope your stay was comfortable as you heal now, mend and rest.    For we have enjoyed taking care of you by giving your our best.    And as you get better, by regaining your health and strength;   We count it as a privilege to have served you and hope your time at [facility] was well spent.      Thank  You!!!                         Discharge Instructions: After Your Surgery/Procedure  Youve just had surgery. During surgery you were given medicine called anesthesia to keep you relaxed and free of pain. After surgery you may have some pain or nausea. This is common. Here are some tips for feeling better and getting well after surgery.     Stay on schedule with your medication.   Going home  Your doctor or nurse will show you how to take care of yourself when you go home. He or she will also answer your questions. Have an adult family member or friend drive you home.      For your safety we recommend these precaution for the first 24 hours after your procedure:  Do not drive or use heavy equipment.  Do not make important decisions or sign legal papers.  Do not drink alcohol.  Have someone stay with you, if needed. He or she can watch for problems and help keep you safe.  Your concentration, balance, coordination, and judgement may be impaired for many hours after anesthesia.  Use caution when ambulating or standing up.     You may feel weak and "washed out" after anesthesia and surgery.      Subtle residual effects of general anesthesia or sedation with regional / local anesthesia can last more than 24 hours.  Rest for the remainder of the day " or longer if your Doctor/Surgeon has advised you to do so.  Although you may feel normal within the first 24 hours, your reflexes and mental ability may be impaired without you realizing it.  You may feel dizzy, lightheaded or sleepy for 24 hours or longer.      Be sure to go to all follow-up visits with your doctor. And rest after your surgery for as long as your doctor tells you to.  Coping with pain  If you have pain after surgery, pain medicine will help you feel better. Take it as told, before pain becomes severe. Also, ask your doctor or pharmacist about other ways to control pain. This might be with heat, ice, or relaxation. And follow any other instructions your surgeon or nurse gives you.  Tips for taking pain medicine  To get the best relief possible, remember these points:  Pain medicines can upset your stomach. Taking them with a little food may help.  Most pain relievers taken by mouth need at least 20 to 30 minutes to start to work.  Taking medicine on a schedule can help you remember to take it. Try to time your medicine so that you can take it before starting an activity. This might be before you get dressed, go for a walk, or sit down for dinner.  Constipation is a common side effect of pain medicines. Call your doctor before taking any medicines such as laxatives or stool softeners to help ease constipation. Also ask if you should skip any foods. Drinking lots of fluids and eating foods such as fruits and vegetables that are high in fiber can also help. Remember, do not take laxatives unless your surgeon has prescribed them.  Drinking alcohol and taking pain medicine can cause dizziness and slow your breathing. It can even be deadly. Do not drink alcohol while taking pain medicine.  Pain medicine can make you react more slowly to things. Do not drive or run machinery while taking pain medicine.  Your health care provider may tell you to take acetaminophen to help ease your pain. Ask him or her how  much you are supposed to take each day. Acetaminophen or other pain relievers may interact with your prescription medicines or other over-the-counter (OTC) drugs. Some prescription medicines have acetaminophen and other ingredients. Using both prescription and OTC acetaminophen for pain can cause you to overdose. Read the labels on your OTC medicines with care. This will help you to clearly know the list of ingredients, how much to take, and any warnings. It may also help you not take too much acetaminophen. If you have questions or do not understand the information, ask your pharmacist or health care provider to explain it to you before you take the OTC medicine.  Managing nausea  Some people have an upset stomach after surgery. This is often because of anesthesia, pain, or pain medicine, or the stress of surgery. These tips will help you handle nausea and eat healthy foods as you get better. If you were on a special food plan before surgery, ask your doctor if you should follow it while you get better. These tips may help:  Do not push yourself to eat. Your body will tell you when to eat and how much.  Start off with clear liquids and soup. They are easier to digest.  Next try semi-solid foods, such as mashed potatoes, applesauce, and gelatin, as you feel ready.  Slowly move to solid foods. Dont eat fatty, rich, or spicy foods at first.  Do not force yourself to have 3 large meals a day. Instead eat smaller amounts more often.  Take pain medicines with a small amount of solid food, such as crackers or toast, to avoid nausea.     Call your surgeon if  You still have pain an hour after taking medicine. The medicine may not be strong enough.  You feel too sleepy, dizzy, or groggy. The medicine may be too strong.  You have side effects like nausea, vomiting, or skin changes, such as rash, itching, or hives.       If you have obstructive sleep apnea  You were given anesthesia medicine during surgery to keep you  comfortable and free of pain. After surgery, you may have more apnea spells because of this medicine and other medicines you were given. The spells may last longer than usual.   At home:  Keep using the continuous positive airway pressure (CPAP) device when you sleep. Unless your health care provider tells you not to, use it when you sleep, day or night. CPAP is a common device used to treat obstructive sleep apnea.  Talk with your provider before taking any pain medicine, muscle relaxants, or sedatives. Your provider will tell you about the possible dangers of taking these medicines.  © 5717-7055 [employer]. [address] [place] [address]. All rights reserved. This information is not intended as a substitute for professional medical care. Always follow your healthcare professional's instructions.                       Using an Incentive Spirometer    An incentive spirometer is a device that helps you do deep breathing exercises. These exercises expand your lungs, aid in circulation, and help prevent pneumonia. Deep breathing exercises also help you breathe better and improve the function of your lungs by:  Keeping your lungs clear  Strengthening your breathing muscles  Helping prevent respiratory complications or problems  The incentive spirometer gives you a way to take an active part in recover. A nurse or therapist will teach you breathing exercises. To do these exercises, you will breathe in through your mouth and not your nose. The incentive spirometer only works correctly if you breathe in through your mouth.  Steps to clear lungs  Step 1. Exhale normally. Then, inhale normally.  Relax and breathe out.  Step 2. Place your lips tightly around the mouthpiece.  Make sure the device is upright and not tilted.  Step 3. Inhale as much air as you can through the mouthpiece (don't breath through your nose).  Inhale slowly and deeply.  Hold your breath long enough to keep the balls or disk  raised for at least 3 to 5 seconds, or as instructed by your healthcare provider.  Some spirometers have an indicator to let you know that you are breathing in too fast. If the indicator goes off, breathe in more slowly.  Step 4. Repeat the exercise regularly.  Do this exercise every hour while you're awake, or as instructed by your healthcare provider.  If you were taught deep breathing and coughing exercises, do them regularly as instructed by your healthcare provider.

## 2023-08-25 VITALS
RESPIRATION RATE: 18 BRPM | BODY MASS INDEX: 33.31 KG/M2 | SYSTOLIC BLOOD PRESSURE: 136 MMHG | HEART RATE: 49 BPM | DIASTOLIC BLOOD PRESSURE: 64 MMHG | TEMPERATURE: 97 F | HEIGHT: 63 IN | OXYGEN SATURATION: 100 % | WEIGHT: 188 LBS

## 2023-08-25 NOTE — PROGRESS NOTES
Very pleased with care given.  Stated all staff were supportive.  Understands all discharge instructions.

## 2023-08-29 NOTE — OP NOTE
DATE OF PROCEDURE: 08/24/2023     COVID-19 attestation:  Due to the nature of this patient's condition and/or the presence of pain, debilitty, and/or dysfunction, proceeding with surgery was indicated.  Furthermore, this patient was treated during the COVID-19 pandemic.  This was discussed with the patient, they are aware of our current policies and procedures, were given the option of delaying their surgery when applicable and if acceptable, and they elect to proceed.  Delaying this patient's surgery greater than 30 days would be detrimental to their care and functional outcome and/or cause additional suffering, pain, discomfort, and/or dysfunction/debility.  This was confirmed and we thus proceeded.       SERVICE:  Orthopedic Surgery.     SURGEON:  Rasta Perez M.D.     FIRST ASSISTANT:  SMA Armin     PREOPERATIVE DIAGNOSIS:    Right index finger mucus cyst     POSTOPERATIVE DIAGNOSIS:    Same     PROCEDURE(S) PERFORMED:    Excisional biopsy right index finger mucus cyst and bony debridement     TOURNIQUET TIME:    28 min at 250mmHg     ESTIMATED BLOOD LOSS:   3 mL.     IMPLANTS:   none     COMPLICATIONS:  None.     PACKS AND DRAINS:  None.     PATHOLOGIC SPECIMENS:   the excised tissue was sent for pathology.     ANESTHESIA:  Local MAC     IV FLUIDS: Crystalloid.     CONDITION:  Stable.     MICROBIOLOGY: None.     Indications for Procedure:      The patient is a 58-year-old female who presented with a significantly symptomatic mass to the right index finger.   The patient has not responded to adequate non operative treatment at this time and/or non operative treatment is not indicated. Thus, the risks, benefits and alternatives to surgery were discussed with the patient in detail.  Specific risks include but are not limited to bleeding, infection, vessel and/or nerve damage, pain, numbness, tingling, compartment syndrome, need for additional surgery, failure to return to pre-injury and/or preoperative  functional status, inability to return to work, scar sensitivity, delayed healing, complex regional pain syndrome, weakness, pulley injury, tendon injury, bowstringing, partial and/or incomplete relief of symptoms, persistence of and/or worsening of symptoms, hardware and/or surgical failure, prominent and/or symptomatic hardware possibly necessitating future removal, osteomyelitis, amputation, loss of function, stiffness, rotational malalignment, functional debility, dysfunction, decreased  strength, need for prolonged postoperative rehabilitation, malunion, nonunion, deep venous thrombosis, pulmonary embolism, arthritis and death.  The patient states an understanding and wishes to proceed with surgery.   All questions were answered.  No guarantees were implied or stated.  Written informed consent was obtained.     Procedure in detail:     On the date of the operative intervention, the patient was evaluated in the preoperative holding area.  With their participation the right upper extremity was marked at the operative site.  The mass in question was circled with her participation.  The patient was then taken to the operating room where they were placed on OR table.  The right upper extremity extremity was then placed on a hand table.   nonsterile tourniquet was placed high on the right upper extremity.  The right upper extremity extremity was then prepped and draped in the usual sterile fashion and time-out was taken and confirmed by all present to confirm the correct patient site procedure and administration of preoperative antibiotics if ordered.  All were in agreement so I proceeded.  1% lidocaine was utilized for digital block to right index finger. An approximately 3 cm incision was next marked out and made with a scalpel overlying the right dorsal index finger.  This was centered overlying the mass.  Dissection was continued through skin subcutaneous tissues to the level of the mass.  This was then  circumferentially identified and isolated. It was excised in block and passed off the field for pathologic specimen.  The wound was then examined and there was no evidence of any residual or remaining soft tissue mass or cyst.  Dissection was continued to the level of the DIP joint taking great care to identify and preserve the terminal extensor tendon and cutaneous sensory nerves.  A rongeur was then utilized to perform extensive debridement of underlying osteophytes.   That wound was then irrigated with copious amounts of sterile saline.  The wound was then closed with 4-0 Chromic suture and dressed with a sterile dressing consisting of Xeroform 4 x 4 gauze cast padding and an ACE.   tourniquet was then discontinued and brisk capillary refill in [place] to the right index finger.  The patient was then awakened from anesthesia return the post anesthesia care in stable condition.  There were no complications.     Postoperative plan for the patient:  The patient be discharged home in stable condition.  She may perform range of motion as tolerated without restrictions.  Follow-up in 2 weeks for suture removal and initiation of occupational therapy.     Please be aware that this note has been generated with the assistance of Malika voice-to-text.  Please excuse any spelling or grammatical errors.

## 2023-09-06 ENCOUNTER — OFFICE VISIT (OUTPATIENT)
Dept: ORTHOPEDICS | Facility: CLINIC | Age: 58
End: 2023-09-06
Payer: COMMERCIAL

## 2023-09-06 DIAGNOSIS — Z98.890 POSTOPERATIVE STATE: ICD-10-CM

## 2023-09-06 DIAGNOSIS — M67.449 DIGITAL MUCINOUS CYST OF FINGER: Primary | ICD-10-CM

## 2023-09-06 PROCEDURE — 3044F PR MOST RECENT HEMOGLOBIN A1C LEVEL <7.0%: ICD-10-PCS | Mod: CPTII,S$GLB,, | Performed by: PHYSICIAN ASSISTANT

## 2023-09-06 PROCEDURE — 99999 PR PBB SHADOW E&M-EST. PATIENT-LVL II: ICD-10-PCS | Mod: PBBFAC,,, | Performed by: PHYSICIAN ASSISTANT

## 2023-09-06 PROCEDURE — 99999 PR PBB SHADOW E&M-EST. PATIENT-LVL II: CPT | Mod: PBBFAC,,, | Performed by: PHYSICIAN ASSISTANT

## 2023-09-06 PROCEDURE — 99024 POSTOP FOLLOW-UP VISIT: CPT | Mod: S$GLB,,, | Performed by: PHYSICIAN ASSISTANT

## 2023-09-06 PROCEDURE — 1159F MED LIST DOCD IN RCRD: CPT | Mod: CPTII,S$GLB,, | Performed by: PHYSICIAN ASSISTANT

## 2023-09-06 PROCEDURE — 3044F HG A1C LEVEL LT 7.0%: CPT | Mod: CPTII,S$GLB,, | Performed by: PHYSICIAN ASSISTANT

## 2023-09-06 PROCEDURE — 99024 PR POST-OP FOLLOW-UP VISIT: ICD-10-PCS | Mod: S$GLB,,, | Performed by: PHYSICIAN ASSISTANT

## 2023-09-06 PROCEDURE — 1159F PR MEDICATION LIST DOCUMENTED IN MEDICAL RECORD: ICD-10-PCS | Mod: CPTII,S$GLB,, | Performed by: PHYSICIAN ASSISTANT

## 2023-09-06 NOTE — PROGRESS NOTES
Dr. Perez is the supervising physician for this encounter/patient    Clyde Arellano presents for post-operative evaluation.  The patient is now 2 weeks s/p Right index mucus cyst excision with Dr. Perez on 8/24/23.  Overall the patient reports doing well.  She reports 4/10 pain, denies any f/c/s, no numbness/tingling.    PE:    AA&O x 4.  NAD  HEENT:  NCAT, sclera nonicteric  Lungs:  Respirations are equal and unlabored.  CV:  2+ bilateral upper and lower extremity pulses.  MSK: The wound is healing well with no signs of erythema or warmth.  There is no drainage.  No clinical signs or symptoms of infection are present.  Stiffness with flexion but active motion is present. SILT. DNVI.    A/P: Status post above, doing well  1) Continue with activity as tolerated.  2) Chromic sutures left in place per her request. Wound care and signs of infection discussed.  3) F/U 2 weeks for wound check.  4) Call with any questions/concerns in the interim      Jamie Russo-DiGeorge PA-C

## 2023-09-20 ENCOUNTER — OFFICE VISIT (OUTPATIENT)
Dept: ORTHOPEDICS | Facility: CLINIC | Age: 58
End: 2023-09-20
Payer: COMMERCIAL

## 2023-09-20 DIAGNOSIS — Z98.890 POSTOPERATIVE STATE: ICD-10-CM

## 2023-09-20 DIAGNOSIS — M67.449 DIGITAL MUCINOUS CYST OF FINGER: Primary | ICD-10-CM

## 2023-09-20 PROCEDURE — 99024 PR POST-OP FOLLOW-UP VISIT: ICD-10-PCS | Mod: S$GLB,,, | Performed by: PHYSICIAN ASSISTANT

## 2023-09-20 PROCEDURE — 99999 PR PBB SHADOW E&M-EST. PATIENT-LVL II: CPT | Mod: PBBFAC,,, | Performed by: PHYSICIAN ASSISTANT

## 2023-09-20 PROCEDURE — 3044F PR MOST RECENT HEMOGLOBIN A1C LEVEL <7.0%: ICD-10-PCS | Mod: CPTII,S$GLB,, | Performed by: PHYSICIAN ASSISTANT

## 2023-09-20 PROCEDURE — 3044F HG A1C LEVEL LT 7.0%: CPT | Mod: CPTII,S$GLB,, | Performed by: PHYSICIAN ASSISTANT

## 2023-09-20 PROCEDURE — 1159F MED LIST DOCD IN RCRD: CPT | Mod: CPTII,S$GLB,, | Performed by: PHYSICIAN ASSISTANT

## 2023-09-20 PROCEDURE — 99024 POSTOP FOLLOW-UP VISIT: CPT | Mod: S$GLB,,, | Performed by: PHYSICIAN ASSISTANT

## 2023-09-20 PROCEDURE — 1159F PR MEDICATION LIST DOCUMENTED IN MEDICAL RECORD: ICD-10-PCS | Mod: CPTII,S$GLB,, | Performed by: PHYSICIAN ASSISTANT

## 2023-09-20 PROCEDURE — 99999 PR PBB SHADOW E&M-EST. PATIENT-LVL II: ICD-10-PCS | Mod: PBBFAC,,, | Performed by: PHYSICIAN ASSISTANT

## 2023-09-20 NOTE — PROGRESS NOTES
Dr. Perez is the supervising physician for this encounter/patient    Clyde Arellano presents for post-operative evaluation.  The patient is now 4 weeks s/p Right index mucus cyst excision with Dr. Perez on 8/24/23.  Overall the patient reports doing well.  She reports 0/10 pain, denies any f/c/s, no numbness/tingling.    Pathology report consistent with digital mucous cyst.    PE:    AA&O x 4.  NAD  HEENT:  NCAT, sclera nonicteric  Lungs:  Respirations are equal and unlabored.  CV:  2+ bilateral upper and lower extremity pulses.  MSK: The wound is healing well with no signs of erythema or warmth.  There is no drainage.  No clinical signs or symptoms of infection are present.  Stiffness with flexion but active motion is present. SILT. DNVI.    A/P: Status post above, doing well  1) Continue with activity as tolerated.  2) Chromic sutures removed today. Wound care and signs of infection discussed. Scar massage discussed.  3) F/U as needed.  4) Call with any questions/concerns in the interim      Jamie Russo-DiGeorge PA-C

## 2024-02-06 ENCOUNTER — LAB VISIT (OUTPATIENT)
Dept: LAB | Facility: HOSPITAL | Age: 59
End: 2024-02-06
Attending: STUDENT IN AN ORGANIZED HEALTH CARE EDUCATION/TRAINING PROGRAM
Payer: COMMERCIAL

## 2024-02-06 ENCOUNTER — TELEPHONE (OUTPATIENT)
Dept: FAMILY MEDICINE | Facility: CLINIC | Age: 59
End: 2024-02-06

## 2024-02-06 ENCOUNTER — OFFICE VISIT (OUTPATIENT)
Dept: FAMILY MEDICINE | Facility: CLINIC | Age: 59
End: 2024-02-06
Payer: COMMERCIAL

## 2024-02-06 VITALS
RESPIRATION RATE: 18 BRPM | WEIGHT: 191.81 LBS | OXYGEN SATURATION: 99 % | TEMPERATURE: 98 F | BODY MASS INDEX: 33.98 KG/M2 | DIASTOLIC BLOOD PRESSURE: 76 MMHG | HEIGHT: 63 IN | SYSTOLIC BLOOD PRESSURE: 120 MMHG | HEART RATE: 76 BPM

## 2024-02-06 DIAGNOSIS — R79.9 ABNORMAL FINDING OF BLOOD CHEMISTRY, UNSPECIFIED: ICD-10-CM

## 2024-02-06 DIAGNOSIS — Z00.00 ENCOUNTER FOR PREVENTIVE HEALTH EXAMINATION: ICD-10-CM

## 2024-02-06 DIAGNOSIS — R42 DIZZINESS AND GIDDINESS: ICD-10-CM

## 2024-02-06 DIAGNOSIS — E78.2 MIXED HYPERLIPIDEMIA: ICD-10-CM

## 2024-02-06 DIAGNOSIS — R51.9 NONINTRACTABLE HEADACHE, UNSPECIFIED CHRONICITY PATTERN, UNSPECIFIED HEADACHE TYPE: ICD-10-CM

## 2024-02-06 DIAGNOSIS — R42 LIGHTHEADED: ICD-10-CM

## 2024-02-06 DIAGNOSIS — Z00.00 ENCOUNTER FOR PREVENTIVE HEALTH EXAMINATION: Primary | ICD-10-CM

## 2024-02-06 DIAGNOSIS — R94.31 ABNORMAL EKG: ICD-10-CM

## 2024-02-06 DIAGNOSIS — R42 DIZZY SPELLS: ICD-10-CM

## 2024-02-06 LAB
ALBUMIN SERPL BCP-MCNC: 4.1 G/DL (ref 3.5–5.2)
ALP SERPL-CCNC: 79 U/L (ref 55–135)
ALT SERPL W/O P-5'-P-CCNC: 19 U/L (ref 10–44)
ANION GAP SERPL CALC-SCNC: 10 MMOL/L (ref 8–16)
AST SERPL-CCNC: 22 U/L (ref 10–40)
BASOPHILS # BLD AUTO: 0.01 K/UL (ref 0–0.2)
BASOPHILS NFR BLD: 0.2 % (ref 0–1.9)
BILIRUB SERPL-MCNC: 0.4 MG/DL (ref 0.1–1)
BUN SERPL-MCNC: 17 MG/DL (ref 6–20)
CALCIUM SERPL-MCNC: 9.5 MG/DL (ref 8.7–10.5)
CHLORIDE SERPL-SCNC: 108 MMOL/L (ref 95–110)
CHOLEST SERPL-MCNC: 207 MG/DL (ref 120–199)
CHOLEST/HDLC SERPL: 4 {RATIO} (ref 2–5)
CO2 SERPL-SCNC: 23 MMOL/L (ref 23–29)
CREAT SERPL-MCNC: 0.9 MG/DL (ref 0.5–1.4)
DIFFERENTIAL METHOD BLD: ABNORMAL
EOSINOPHIL # BLD AUTO: 0 K/UL (ref 0–0.5)
EOSINOPHIL NFR BLD: 0.9 % (ref 0–8)
ERYTHROCYTE [DISTWIDTH] IN BLOOD BY AUTOMATED COUNT: 11.9 % (ref 11.5–14.5)
EST. GFR  (NO RACE VARIABLE): >60 ML/MIN/1.73 M^2
ESTIMATED AVG GLUCOSE: 108 MG/DL (ref 68–131)
GLUCOSE SERPL-MCNC: 93 MG/DL (ref 70–110)
HBA1C MFR BLD: 5.4 % (ref 4–5.6)
HCT VFR BLD AUTO: 41.9 % (ref 37–48.5)
HDLC SERPL-MCNC: 52 MG/DL (ref 40–75)
HDLC SERPL: 25.1 % (ref 20–50)
HGB BLD-MCNC: 14.1 G/DL (ref 12–16)
IMM GRANULOCYTES # BLD AUTO: 0.01 K/UL (ref 0–0.04)
IMM GRANULOCYTES NFR BLD AUTO: 0.2 % (ref 0–0.5)
LDLC SERPL CALC-MCNC: 138.8 MG/DL (ref 63–159)
LYMPHOCYTES # BLD AUTO: 1.1 K/UL (ref 1–4.8)
LYMPHOCYTES NFR BLD: 24.2 % (ref 18–48)
MAGNESIUM SERPL-MCNC: 2.1 MG/DL (ref 1.6–2.6)
MCH RBC QN AUTO: 33.9 PG (ref 27–31)
MCHC RBC AUTO-ENTMCNC: 33.7 G/DL (ref 32–36)
MCV RBC AUTO: 101 FL (ref 82–98)
MONOCYTES # BLD AUTO: 0.5 K/UL (ref 0.3–1)
MONOCYTES NFR BLD: 10.4 % (ref 4–15)
NEUTROPHILS # BLD AUTO: 2.9 K/UL (ref 1.8–7.7)
NEUTROPHILS NFR BLD: 64.1 % (ref 38–73)
NONHDLC SERPL-MCNC: 155 MG/DL
NRBC BLD-RTO: 0 /100 WBC
OHS QRS DURATION: 86 MS
OHS QTC CALCULATION: 426 MS
PLATELET # BLD AUTO: 247 K/UL (ref 150–450)
PMV BLD AUTO: 12.1 FL (ref 9.2–12.9)
POTASSIUM SERPL-SCNC: 4.3 MMOL/L (ref 3.5–5.1)
PROT SERPL-MCNC: 7.2 G/DL (ref 6–8.4)
RBC # BLD AUTO: 4.16 M/UL (ref 4–5.4)
SODIUM SERPL-SCNC: 141 MMOL/L (ref 136–145)
TRIGL SERPL-MCNC: 81 MG/DL (ref 30–150)
TSH SERPL DL<=0.005 MIU/L-ACNC: 2.61 UIU/ML (ref 0.4–4)
WBC # BLD AUTO: 4.5 K/UL (ref 3.9–12.7)

## 2024-02-06 PROCEDURE — 1159F MED LIST DOCD IN RCRD: CPT | Mod: CPTII,S$GLB,, | Performed by: STUDENT IN AN ORGANIZED HEALTH CARE EDUCATION/TRAINING PROGRAM

## 2024-02-06 PROCEDURE — 93005 ELECTROCARDIOGRAM TRACING: CPT | Mod: S$GLB,,, | Performed by: STUDENT IN AN ORGANIZED HEALTH CARE EDUCATION/TRAINING PROGRAM

## 2024-02-06 PROCEDURE — 85025 COMPLETE CBC W/AUTO DIFF WBC: CPT | Performed by: STUDENT IN AN ORGANIZED HEALTH CARE EDUCATION/TRAINING PROGRAM

## 2024-02-06 PROCEDURE — 93010 ELECTROCARDIOGRAM REPORT: CPT | Mod: S$GLB,,, | Performed by: INTERNAL MEDICINE

## 2024-02-06 PROCEDURE — 3074F SYST BP LT 130 MM HG: CPT | Mod: CPTII,S$GLB,, | Performed by: STUDENT IN AN ORGANIZED HEALTH CARE EDUCATION/TRAINING PROGRAM

## 2024-02-06 PROCEDURE — 83036 HEMOGLOBIN GLYCOSYLATED A1C: CPT | Performed by: STUDENT IN AN ORGANIZED HEALTH CARE EDUCATION/TRAINING PROGRAM

## 2024-02-06 PROCEDURE — 3078F DIAST BP <80 MM HG: CPT | Mod: CPTII,S$GLB,, | Performed by: STUDENT IN AN ORGANIZED HEALTH CARE EDUCATION/TRAINING PROGRAM

## 2024-02-06 PROCEDURE — 82607 VITAMIN B-12: CPT | Performed by: STUDENT IN AN ORGANIZED HEALTH CARE EDUCATION/TRAINING PROGRAM

## 2024-02-06 PROCEDURE — 84443 ASSAY THYROID STIM HORMONE: CPT | Performed by: STUDENT IN AN ORGANIZED HEALTH CARE EDUCATION/TRAINING PROGRAM

## 2024-02-06 PROCEDURE — 83735 ASSAY OF MAGNESIUM: CPT | Performed by: STUDENT IN AN ORGANIZED HEALTH CARE EDUCATION/TRAINING PROGRAM

## 2024-02-06 PROCEDURE — 82525 ASSAY OF COPPER: CPT | Performed by: STUDENT IN AN ORGANIZED HEALTH CARE EDUCATION/TRAINING PROGRAM

## 2024-02-06 PROCEDURE — 3008F BODY MASS INDEX DOCD: CPT | Mod: CPTII,S$GLB,, | Performed by: STUDENT IN AN ORGANIZED HEALTH CARE EDUCATION/TRAINING PROGRAM

## 2024-02-06 PROCEDURE — 99215 OFFICE O/P EST HI 40 MIN: CPT | Mod: S$GLB,,, | Performed by: STUDENT IN AN ORGANIZED HEALTH CARE EDUCATION/TRAINING PROGRAM

## 2024-02-06 PROCEDURE — 99999 PR PBB SHADOW E&M-EST. PATIENT-LVL IV: CPT | Mod: PBBFAC,,, | Performed by: STUDENT IN AN ORGANIZED HEALTH CARE EDUCATION/TRAINING PROGRAM

## 2024-02-06 PROCEDURE — 80053 COMPREHEN METABOLIC PANEL: CPT | Performed by: STUDENT IN AN ORGANIZED HEALTH CARE EDUCATION/TRAINING PROGRAM

## 2024-02-06 PROCEDURE — 80061 LIPID PANEL: CPT | Performed by: STUDENT IN AN ORGANIZED HEALTH CARE EDUCATION/TRAINING PROGRAM

## 2024-02-06 RX ORDER — FAMOTIDINE 10 MG/1
10 TABLET ORAL 2 TIMES DAILY PRN
Start: 2024-02-06

## 2024-02-06 NOTE — PROGRESS NOTES
[facility] Note    Subjective:    The HPI and pertinent ROS is included in the Diagnostic Impression Remarks section at the end of the note. Please see below for further details. Chief complaint is at end of note.     Clyde is a pleasant intelligent patient who is here for evaluation.     Modified Medications    No medications on file       Data reviewed 274}  Previous medical records reviewed and summarized in plan section at end of note.      If you are due for any health screening(s) below please notify me so we can arrange them to be ordered and scheduled. Most healthy patients at your age complete them, but you are free to accept or refuse. If you can't do it, I'll definitely understand. If you can, I'd certainly appreciate it!     Tests to Keep You Healthy    Mammogram: Met on 4/4/2023  Colon Cancer Screening: Met on 1/24/2023  Cervical Cancer Screening: ORDERED      The following portions of the patient's history were reviewed and updated as appropriate: allergies, current medications, past family history, past medical history, past social history, past surgical history and problem list.  Patient has depression but has no suicidal thoughts or intent and she has no known weapons access and she is future oriented with support in the community and I emphasized that if she ever develops thoughts of hurting she to directly go to the emergency department which she agreed to and she is willing to have close follow up with me. I gave she my clinics number in case she wants to contact me further and I gave she the suicide hotline [phone] in case any harmful thoughts arise. Upon my exam she did not endorse bipolar symptoms and I do not suspect she has Bipolar disease.Patient understood the plan and agreed to treatment plan. The patient does not constitute a danger to self of others by virtue of her current condition. PSS-3 not positive.   She  has a past medical history of Acid reflux.  She  has  a past surgical history that includes Elbow fracture surgery; Dilation and curettage of uterus; Esophagogastroduodenoscopy (N/A, 3/31/2023); and Cyst Removal (Right, 8/24/2023).    She  reports that she has never smoked. She has never been exposed to tobacco smoke. She has never used smokeless tobacco. She reports that she does not currently use alcohol. She reports that she does not use drugs.  She family history includes Cancer in her maternal grandfather and sister; Stomach cancer in her maternal grandfather.    Review of patient's allergies indicates:  No Known Allergies    Tobacco Use: Low Risk  (2/6/2024)    Patient History     Smoking Tobacco Use: Never     Smokeless Tobacco Use: Never     Passive Exposure: Never     Physical Examination  General appearance: alert, cooperative, no distress  Neck: no thyromegaly, no neck stiffness  Lungs: clear to auscultation, no wheezes, rales or rhonchi, symmetric air entry  Heart: normal rate, regular rhythm, normal S1, S2, no murmurs, rubs, clicks or gallops  Abdomen: soft, nontender, nondistended, no rigidity, rebound, or guarding.   Back: no point tenderness over spine  Extremities: peripheral pulses normal, no unilateral leg swelling or calf tenderness   Neurological:alert, oriented, normal speech, no new focal findings or movement disorder noted from baseline  Neuro:               MS: alert, awake, oriented x3. Speech and thought process intact.  CN: PERRLA. Pupils constricted normally to light. Pupils constricted when looking at a near object, dilated when looking at a distant object, converged when my finger moved towards the nose.  Visual fields intact. EOMI. Sensation to light touch in all trigeminal divisions intact. Able to closed eyes tightly and smile without asymmetry. Hearing intact. No uvula deviation. 5/5 shoulder shrugs. Tongue able to protrude bilaterally without difficulty.   Motor: 5/5 all ext strength.  Sensation: to light touch intact throughout.  "Coordination: Intact finger-to-nose, walked with normal gait across room.           BP Readings from Last 3 Encounters:   02/06/24 120/76   08/24/23 136/64   06/14/23 126/68     Wt Readings from Last 3 Encounters:   02/06/24 87 kg (191 lb 12.8 oz)   08/24/23 85.3 kg (188 lb)   06/29/23 85.3 kg (188 lb)     /76 (BP Location: Right arm, Patient Position: Sitting, BP Method: Large (Manual))   Pulse 76   Temp 97.8 °F (36.6 °C) (Oral)   Resp 18   Ht 5' 3" (1.6 m)   Wt 87 kg (191 lb 12.8 oz)   LMP 01/01/2009   SpO2 99%   BMI 33.98 kg/m²    274}  Laboratory: I have reviewed old labs below:    274}    Lab Results   Component Value Date    WBC 6.05 01/12/2023    HGB 14.2 01/12/2023    HCT 42.2 01/12/2023     (H) 01/12/2023     01/12/2023     01/12/2023    K 4.6 01/12/2023     01/12/2023    CALCIUM 10.2 01/12/2023    CO2 28 01/12/2023    GLU 71 01/12/2023    BUN 17 01/12/2023    CREATININE 0.9 01/12/2023    EGFRNORACEVR >60.0 01/12/2023    ANIONGAP 10 01/12/2023    PROT 7.2 01/12/2023    ALBUMIN 4.3 01/12/2023    BILITOT 0.6 01/12/2023    ALKPHOS 85 01/12/2023    ALT 33 01/12/2023    AST 28 01/12/2023    CHOL 231 (H) 01/12/2023    TRIG 95 01/12/2023    HDL 62 01/12/2023    LDLCALC 150.0 01/12/2023    TSH 2.447 01/12/2023    HGBA1C 5.1 01/12/2023      Lab reviewed by me: Particular labs of significance that I will monitor, workup, or treat to improve are mentioned below in diagnostic impression remarks.    The 10-year ASCVD risk score (Angella DK, et al., 2019) is: 3%    Values used to calculate the score:      Age: 59 years      Sex: Female      Is Non- : No      Diabetic: No      Tobacco smoker: No      Systolic Blood Pressure: 120 mmHg      Is BP treated: No      HDL Cholesterol: 62 mg/dL      Total Cholesterol: 231 mg/dL    Imaging/EKG: I have reviewed the pertinent results and my findings are noted in remarks.  274}    CC:   Chief Complaint   Patient presents " "with    Annual Exam    Dizziness        274}    Assessment/Plan  Clyde Arellano is a 59 y.o. female who presents to clinic with:  1. Encounter for preventive health examination    2. Mixed hyperlipidemia    3. Lightheaded    4. Abnormal finding of blood chemistry, unspecified    5. Dizzy spells    6. Nonintractable headache, unspecified chronicity pattern, unspecified headache type    7. Dizziness and giddiness    8. Abnormal EKG       274}  Diagnostic Impression Remarks + HPI     Documentation entered by me for this encounter may have been done in part using speech-recognition technology. Although I have made an effort to ensure accuracy, "sound like" errors may exist and should be interpreted in context.     Preventive-will get blood work and follow-up on this    Dizzy spells -patient reports that over the past few months had some dizzy spells intermittent only lasts for few seconds and then resolved related to positional changes she does not have headaches when these happen she does not pass out.  She reports no chest pain palpitations or shortness a breath no diaphoresis nausea vomiting during these no leg swelling.  Patient reports that I changed anything with her regular routine.  Patient reports no slurred speech unilateral weakness or numbness.  Patient reports that she has had headaches for years these have increasing frequency.  No nausea vomiting light sensitivity bilateral in the front they do not occur in the morning and no nausea vomiting in the morning.   Patient had normal neurological exam and she has some metal in his unable to get MRI.  Focal deficit appreciated get CT scan because we can not get MRI at this time she reports.  EKG performed Did show left axis deviation will get echocardiogram along with Zio patch she reports no chest pain or shortness a breathwill follow up on blood work as well recommended check vitals as well.  Does not fit the classic BPPV but does lasts a few seconds could be " been on you unusual presentation.  Could consider ENT referral as well   Hyperlipidemia-will recheck blood work monitor   Headache- patient reports having a headache a couple of times per month bilateral no sensitivity to light or sound She denies unilateral weakness, slurred speech, facial droop, and new onset numbness. No fever, neck stiffness, or neck pain. She does not endorse seizures, confusion, morning vomiting, or weight loss. No jaw claudication, vision changes, temporal tenderness, or eye pain. She denies sudden onset, maximum intensity of pain at onset, and head trauma. Will get imaging due to advanced age         I have very low suspicion for alternative causes of headache such as intracranial hemorrhage, ischemic process, meningitis, temporal arteritis, or mass. I do not suspect an intracranial hemorrhage since she does not endorse a sudden onset severe headache, thunderclap headache, or worse headache of her life. An ischemic process is low on the differential since she has no new unilateral weakness, slurred speech, facial droop, or new onset numbness. Meningitis is unlikely since she denies fever, photophobia, confusion, and no neck stiffness was appreciate on her exam. My suspicion for temporal arteritis is low since she has no jaw claudication, unilateral vision loss, temporal tenderness, or eye pain. I also do not suspect a mass since she denies seizures, confusion, morning vomiting, and unintentional weight loss. I do not suspect a carotid or vertebral artery dissection since the pain does not radiate to the neck and I did not appreciate Nydia syndrome. Glaucoma is low on my differential since there is no eye redness or tearing. The patient has a non-focal neurological examination with history and I do not think further brain imaging or lumbar puncture is indicated at this time.       I have very low suspicion for alternative causes of dizziness such as intracranial hemorrhage, ischemic process,  stroke, or mass. I do not suspect an intracranial hemorrhage since she does not endorse a sudden onset severe headache, thunderclap headache, or worse headache of her life. An ischemic process is low on the differential since she has no new unilateral weakness, slurred speech, facial droop, or new onset numbness. A posterior circulation stroke is unlikely since the  exam did not support the diagnosis of a stroke and no new focal neuro deficit was appreciate on her exam. I also do not suspect a mass since she denies seizures, confusion, morning vomiting, and unintentional weight loss. I do not suspect a carotid or vertebral artery dissection since the patient denies neck pain and I did not appreciate Nydia syndrome. I have a low suspicion that her dizziness is due to cardiac ischemia since she does not endorse chest pain upon exertion, dyspnea, nausea, vomiting, diaphoresis, or radiation to the arm, jaw, or back. Aortic dissection is low on the differential since she denies a ripping or tearing sensation chest pain, chest pain that radiates to the back, sudden severe abdominal pain, there is no pulse deficit, and there is no pulsatile abdominal mass. A PE is unlikely since she has no pleuritic chest pain, unilateral leg swelling, calf tenderness, tachycardia, tachypnea or hypoxia. I have a very low suspicion that the event is secondary to a dangerous arrhythmia and the patient denies syncope and presyncope. It is also unlikely that hypoglycemia was a cause of the patient's dizziness after my history and examination was performed.       At this point in time the patient is considered a low risk as determined by her history, physical, and risk stratification. The patient has a non-focal neurological examination with history and I do not think emergent brain imaging or lumbar puncture is indicated at this time.     Regarding the patient's dizziness and based on my evaluation, there is no emergent medical condition.  Nonetheless, dizziness precautions were discussed with the patient and/or caretaker, specifically not to swim or bathe unattended, not to operate motor vehicles or other machinery, and to avoid heights or other areas where falls may occur until cleared by primary care physician.     I explained the risks and benefits and share decision making was made. She verbalized understanding and chose symptomatic treatment with close monitoring. Therefore, we decided to monitor for improvement and then proceed with close follow up if necessary. She was instructed that if her symptoms persists or worsen to immediately go to the ED and she agreed to this plan.          Altogether 41 minutes of total time spent on the encounter, which includes face to face time and non-face to face time preparing to see the patient (eg, review of tests), Obtaining and/or reviewing separately obtained history, Documenting clinical information in the electronic or other health record, Independently interpreting results (not separately reported) and communicating results to the patient/family/caregiver, or Care coordination (not separately reported). I also counseled her on common and the most usual side effect of prescribed medications. Risk and benefits of the medication were also discussed. I reviewed previous notes to better coordinate patient's care. She test results and lifestyle changes were also discussed. All questions were answered, and patient voiced understanding.     This is the extent of this pleasant patient's concerns at this present time. She did not feel chest pain upon exertion, dyspnea, nausea, vomiting, diaphoresis, or syncope. No pleuritic chest pain, unilateral leg swelling, calf tenderness, or calf pain. Negative for unintentional weight loss night sweats, hematuria, and fevers. Clyde will return to clinic in a few months for further workup and reassessment or sooner as needed. She was instructed to call the clinic or go to  the emergency department or urgent care immediately if her symptoms do not improve, worsens, or if any new symptoms develop. As we discussed that symptoms could worsen over the next 24 hours she was advised that if any increased swelling, pain, or numbness arise to go immediately to the ED. Patient knows to call any time if an emergency arises. Shared decision making occurred and she verbalized understanding in agreement with this plan. I discussed imaging findings, diagnosis, possibilities, treatment options, medications, risks, and benefits. She had many questions regarding the options and long-term effects. All questions were answered. She expressed understanding after counseling regarding the diagnosis and recommendations. She was capable and demonstrated competence with understanding of these options. Shared decision making was performed resulting in her choosing the current treatment plan. Patient handout was given with instructions and recommendations. Advised the patient that if they become pregnant to alert us immediately to assess for medication changes. Having a healthy weight can decrease the risk of 13 cancers and is an important goal. I also discussed the importance of close follow up to discuss labs, change or modify her medications if needed, monitor side effects, and further evaluation of medical problems.     Additional workup planned: see labs ordered below.    See below for labs and meds ordered with associated diagnosis      1. Encounter for preventive health examination  - CBC Auto Differential; Future  - Comprehensive Metabolic Panel; Future  - Hemoglobin A1C; Future  - TSH; Future  - Lipid Panel; Future  - Magnesium; Future    2. Mixed hyperlipidemia  - Lipid Panel; Future    3. Lightheaded    4. Abnormal finding of blood chemistry, unspecified  - CBC Auto Differential; Future  - Comprehensive Metabolic Panel; Future  - Hemoglobin A1C; Future  - TSH; Future  - Lipid Panel; Future  -  Magnesium; Future  - COPPER, SERUM; Future  - Vitamin B12 Deficiency Panel; Future    5. Dizzy spells  - EKG 12-lead; Future  - Echo; Future    6. Nonintractable headache, unspecified chronicity pattern, unspecified headache type    7. Dizziness and giddiness  - CT Head Without Contrast; Future    8. Abnormal EKG  - Echo; Future      Jj Soares MD   274}    If you are due for any health screening(s) below please notify me so we can arrange them to be ordered and scheduled. Most healthy patients at your age complete them, but you are free to accept or refuse.     If you can't do it, I'll definitely understand. If you can, I'd certainly appreciate it!   Tests to Keep You Healthy    Mammogram: Met on 4/4/2023  Colon Cancer Screening: Met on 1/24/2023  Cervical Cancer Screening: ORDERED

## 2024-02-06 NOTE — TELEPHONE ENCOUNTER
----- Message from Lauryn Anderson sent at 2/6/2024 12:11 PM CST -----  Contact: self  Pt states that he put in her exit summary that she was to stop taking the Pepcid antiacid.  She takes this everyday so if she is to stop this what does she take to help with this, her food gets stuck so she needs something to take.  Call back at [phone] to advise.

## 2024-02-08 LAB — VIT B12 SERPL-MCNC: 1340 NG/L (ref 180–914)

## 2024-02-11 LAB — COPPER SERPL-MCNC: 1065 UG/L (ref 810–1990)

## 2024-02-12 ENCOUNTER — HOSPITAL ENCOUNTER (OUTPATIENT)
Dept: RADIOLOGY | Facility: HOSPITAL | Age: 59
Discharge: HOME | End: 2024-02-12
Attending: STUDENT IN AN ORGANIZED HEALTH CARE EDUCATION/TRAINING PROGRAM
Payer: COMMERCIAL

## 2024-02-12 ENCOUNTER — E-CONSULT (OUTPATIENT)
Dept: NEUROLOGY | Facility: CLINIC | Age: 59
End: 2024-02-12
Payer: COMMERCIAL

## 2024-02-12 ENCOUNTER — TELEPHONE (OUTPATIENT)
Dept: FAMILY MEDICINE | Facility: CLINIC | Age: 59
End: 2024-02-12
Payer: COMMERCIAL

## 2024-02-12 DIAGNOSIS — R42 DIZZINESS AND GIDDINESS: ICD-10-CM

## 2024-02-12 DIAGNOSIS — R42 DIZZINESS: Primary | ICD-10-CM

## 2024-02-12 DIAGNOSIS — G93.0 ARACHNOID CYST: Primary | ICD-10-CM

## 2024-02-12 PROCEDURE — 70450 CT HEAD/BRAIN W/O DYE: CPT | Mod: TC

## 2024-02-12 PROCEDURE — 70450 CT HEAD/BRAIN W/O DYE: CPT | Mod: 26,,, | Performed by: RADIOLOGY

## 2024-02-12 PROCEDURE — 99447 NTRPROF PH1/NTRNET/EHR 11-20: CPT | Mod: S$GLB,,, | Performed by: PSYCHIATRY & NEUROLOGY

## 2024-02-12 PROCEDURE — 99452 NTRPROF PH1/NTRNET/EHR RFRL: CPT | Mod: S$GLB,,, | Performed by: STUDENT IN AN ORGANIZED HEALTH CARE EDUCATION/TRAINING PROGRAM

## 2024-02-12 NOTE — CONSULTS
[facility]- Neurology  Response for E-Consult     Patient Name: Clyde Arellano  MRN: [id number]  Primary Care Provider: Jj Soares MD   Requesting Provider: Jj Soares MD  Consults    Recommendation:     Good afternoon:    -That possible cystic lesion seems an incidental finding. I would not think this is cause of dizziness but I have no other imagine to compare. MRI brain with and without contrast can be done to make kathleen there is not something else causing his symptoms.    Have a great day.    Total time of Consultation: 15 minute    I did not speak to the requesting provider verbally about this.     *This eConsult is based on the clinical data available to me and is furnished without benefit of a physical examination. I spent more than 50% of the time reviewing chart which included labs, imaging, medications, and also decision making. Communication between providers is done through [employer] EMR.The eConsult will need to be interpreted in light of any clinical issues or changes in patient status not available to me at the time of filing this eConsults. Significant changes in patient condition or level of acuity should result in immediate formal consultation and reevaluation. Please alert me if you have further questions.    Thank you for this eConsult referral.     Tulio Pepper MD  [facility]- Neurology

## 2024-02-14 ENCOUNTER — TELEPHONE (OUTPATIENT)
Dept: FAMILY MEDICINE | Facility: CLINIC | Age: 59
End: 2024-02-14
Payer: COMMERCIAL

## 2024-02-14 NOTE — TELEPHONE ENCOUNTER
----- Message from Jj Soares MD sent at 2/12/2024  3:17 PM CST -----  Regarding: neuro recs  Neurologist looked at cyst and it is not causing her symptoms. He said we could get an MRI if her symptoms dont improve

## 2024-02-14 NOTE — TELEPHONE ENCOUNTER
She stili has the HA and dizziness , she ca't have a MRI. She has metal on her body, please advise

## 2024-02-15 ENCOUNTER — HOSPITAL ENCOUNTER (OUTPATIENT)
Dept: CARDIOLOGY | Facility: CLINIC | Age: 59
Discharge: HOME | End: 2024-02-15
Attending: STUDENT IN AN ORGANIZED HEALTH CARE EDUCATION/TRAINING PROGRAM
Payer: COMMERCIAL

## 2024-02-15 ENCOUNTER — HOSPITAL ENCOUNTER (OUTPATIENT)
Dept: CARDIOLOGY | Facility: HOSPITAL | Age: 59
Discharge: HOME | End: 2024-02-15
Attending: STUDENT IN AN ORGANIZED HEALTH CARE EDUCATION/TRAINING PROGRAM
Payer: COMMERCIAL

## 2024-02-15 VITALS — BODY MASS INDEX: 33.84 KG/M2 | HEIGHT: 63 IN | WEIGHT: 191 LBS

## 2024-02-15 DIAGNOSIS — R42 DIZZY SPELLS: ICD-10-CM

## 2024-02-15 DIAGNOSIS — R94.31 ABNORMAL EKG: ICD-10-CM

## 2024-02-15 PROCEDURE — 93306 TTE W/DOPPLER COMPLETE: CPT

## 2024-02-15 PROCEDURE — 93244 EXT ECG>48HR<7D REV&INTERPJ: CPT | Mod: ,,, | Performed by: GENERAL PRACTICE

## 2024-02-15 PROCEDURE — 93242 EXT ECG>48HR<7D RECORDING: CPT | Mod: ,,, | Performed by: GENERAL PRACTICE

## 2024-02-15 PROCEDURE — 93306 TTE W/DOPPLER COMPLETE: CPT | Mod: 26,,, | Performed by: INTERNAL MEDICINE

## 2024-02-16 LAB
AORTIC ROOT ANNULUS: 2.7 CM
AORTIC VALVE CUSP SEPERATION: 1.9 CM
AV INDEX (PROSTH): 0.74
AV MEAN GRADIENT: 4 MMHG
AV PEAK GRADIENT: 7 MMHG
AV VALVE AREA BY VELOCITY RATIO: 2.36 CM²
AV VALVE AREA: 2.32 CM²
AV VELOCITY RATIO: 0.75
BSA FOR ECHO PROCEDURE: 1.96 M2
CV ECHO LV RWT: 0.53 CM
DOP CALC AO PEAK VEL: 1.36 M/S
DOP CALC AO VTI: 32 CM
DOP CALC LVOT AREA: 3.1 CM2
DOP CALC LVOT DIAMETER: 2 CM
DOP CALC LVOT PEAK VEL: 1.02 M/S
DOP CALC LVOT STROKE VOLUME: 74.1 CM3
DOP CALCLVOT PEAK VEL VTI: 23.6 CM
E WAVE DECELERATION TIME: 195 MSEC
E/A RATIO: 0.98
E/E' RATIO: 9.24 M/S
ECHO LV POSTERIOR WALL: 1.07 CM (ref 0.6–1.1)
FRACTIONAL SHORTENING: 36 % (ref 28–44)
INTERVENTRICULAR SEPTUM: 1.24 CM (ref 0.6–1.1)
IVRT: 90 MSEC
LEFT ATRIUM SIZE: 3.8 CM
LEFT INTERNAL DIMENSION IN SYSTOLE: 2.61 CM (ref 2.1–4)
LEFT VENTRICLE DIASTOLIC VOLUME INDEX: 37.95 ML/M2
LEFT VENTRICLE DIASTOLIC VOLUME: 72.1 ML
LEFT VENTRICLE MASS INDEX: 84 G/M2
LEFT VENTRICLE SYSTOLIC VOLUME INDEX: 13.1 ML/M2
LEFT VENTRICLE SYSTOLIC VOLUME: 24.8 ML
LEFT VENTRICULAR INTERNAL DIMENSION IN DIASTOLE: 4.05 CM (ref 3.5–6)
LEFT VENTRICULAR MASS: 159.37 G
LV LATERAL E/E' RATIO: 8.82 M/S
LV SEPTAL E/E' RATIO: 9.7 M/S
LVOT MG: 2 MMHG
LVOT MV: 0.64 CM/S
MV PEAK A VEL: 0.99 M/S
MV PEAK E VEL: 0.97 M/S
MV STENOSIS PRESSURE HALF TIME: 52 MS
MV VALVE AREA P 1/2 METHOD: 4.23 CM2
PISA TR MAX VEL: 2.84 M/S
RA PRESSURE ESTIMATED: 3 MMHG
RIGHT VENTRICULAR END-DIASTOLIC DIMENSION: 2.56 CM
RV TB RVSP: 6 MMHG
TDI LATERAL: 0.11 M/S
TDI SEPTAL: 0.1 M/S
TDI: 0.11 M/S
TR MAX PG: 32 MMHG
TV REST PULMONARY ARTERY PRESSURE: 35 MMHG
Z-SCORE OF LEFT VENTRICULAR DIMENSION IN END DIASTOLE: -2.75
Z-SCORE OF LEFT VENTRICULAR DIMENSION IN END SYSTOLE: -1.82

## 2024-02-27 ENCOUNTER — TELEPHONE (OUTPATIENT)
Dept: FAMILY MEDICINE | Facility: CLINIC | Age: 59
End: 2024-02-27
Payer: COMMERCIAL

## 2024-02-27 LAB
OHS CV EVENT MONITOR DAY: 4
OHS CV HOLTER HOOKUP DATE: NORMAL
OHS CV HOLTER HOOKUP TIME: NORMAL
OHS CV HOLTER LENGTH DECIMAL HOURS: 118
OHS CV HOLTER LENGTH HOURS: 22
OHS CV HOLTER LENGTH MINUTES: 0
OHS CV HOLTER SCAN DATE: NORMAL
OHS CV HOLTER SINUS AVERAGE HR: 63 BPM
OHS CV HOLTER SINUS MAX HR: 164 BPM
OHS CV HOLTER SINUS MIN HR: 45 BPM
OHS CV HOLTER STUDY END DATE: NORMAL
OHS CV HOLTER STUDY END TIME: NORMAL

## 2024-02-27 NOTE — TELEPHONE ENCOUNTER
----- Message from Shena Toledo sent at 2/27/2024  1:42 PM CST -----  Contact: patient  Type:  Patient Returning Call    Who Called:patient     Who Left Message for Patient:    Does the patient know what this is regarding?:yes     Would the patient rather a call back or a response via [facility]? Call     Best Call Back Number:[phone] (home)      Additional Information:

## 2024-03-07 ENCOUNTER — OFFICE VISIT (OUTPATIENT)
Dept: OTOLARYNGOLOGY | Facility: CLINIC | Age: 59
End: 2024-03-07
Payer: COMMERCIAL

## 2024-03-07 VITALS
HEART RATE: 58 BPM | HEIGHT: 63 IN | BODY MASS INDEX: 33.79 KG/M2 | DIASTOLIC BLOOD PRESSURE: 65 MMHG | WEIGHT: 190.69 LBS | SYSTOLIC BLOOD PRESSURE: 136 MMHG

## 2024-03-07 DIAGNOSIS — R42 DIZZINESS AND GIDDINESS: Primary | ICD-10-CM

## 2024-03-07 DIAGNOSIS — R93.0 ABNORMAL CT OF THE HEAD: ICD-10-CM

## 2024-03-07 DIAGNOSIS — H93.13 TINNITUS, BILATERAL: ICD-10-CM

## 2024-03-07 DIAGNOSIS — H90.3 HEARING LOSS SENSORY, BILATERAL: ICD-10-CM

## 2024-03-07 PROCEDURE — 99215 OFFICE O/P EST HI 40 MIN: CPT | Mod: S$GLB,,, | Performed by: OTOLARYNGOLOGY

## 2024-03-07 PROCEDURE — 3008F BODY MASS INDEX DOCD: CPT | Mod: CPTII,S$GLB,, | Performed by: OTOLARYNGOLOGY

## 2024-03-07 PROCEDURE — 3075F SYST BP GE 130 - 139MM HG: CPT | Mod: CPTII,S$GLB,, | Performed by: OTOLARYNGOLOGY

## 2024-03-07 PROCEDURE — 1159F MED LIST DOCD IN RCRD: CPT | Mod: CPTII,S$GLB,, | Performed by: OTOLARYNGOLOGY

## 2024-03-07 PROCEDURE — 1160F RVW MEDS BY RX/DR IN RCRD: CPT | Mod: CPTII,S$GLB,, | Performed by: OTOLARYNGOLOGY

## 2024-03-07 PROCEDURE — 3078F DIAST BP <80 MM HG: CPT | Mod: CPTII,S$GLB,, | Performed by: OTOLARYNGOLOGY

## 2024-03-07 PROCEDURE — 3044F HG A1C LEVEL LT 7.0%: CPT | Mod: CPTII,S$GLB,, | Performed by: OTOLARYNGOLOGY

## 2024-03-07 PROCEDURE — 99999 PR PBB SHADOW E&M-EST. PATIENT-LVL IV: CPT | Mod: PBBFAC,,, | Performed by: OTOLARYNGOLOGY

## 2024-03-07 NOTE — PROGRESS NOTES
[facility]    Subjective:      Patient: Clyde Arellano Patient PCP: Jj Soares MD         :  1965     Sex:  female      MRN:  [id number]          Date of Visit: 2024      Chief Complaint: Dizziness (Pt states she has headaches for the past year off and on almost everyday. Pt went to doctor and they stated she had a mass in the back of her head . So, they showed a neurologist which then stated she should have an MRI done but patient can't have it done do to metal in arm. Dizziness when she was laying down, driving, standing up been going on for a month or two. No Audiogram scheduled)      Patient ID: Clyde Arellano is a 59 y.o. female     Patient is a  lifelong NON-smoker with a past medical history of tinnitus and cerumen impaction seen by Dr. Castellon a year ago (notes were and audiogram reviewed) for which discussion of tinnitus in his limited to treatments including amplification were discussed and recommendation for hearing surveillance was made seen today self-referred for evaluation of dizziness and ABNORMAL CT REPORT.  Patient reports dizziness is incurred lying down standing up and with driving ongoing leak for the last 1-2 months.  Not specifically spinning.  Not lasting just seconds.  No associated fluctuations of hearing fullness or tinnitus which is chronic and nonpulsatile.  CT imaging reviewed with some maxillary thickening noted on the right but the maxillary sinuses generally excluded and a finding of lower density fluid like collection of the occipital right area of the brain as noted below.  No current audiogram audiogram from last year as below generally normal with some high-frequency borderline hearing loss on the right and mild 8 K hearing loss on the left with normal tympanometry SRT and WRS bilaterally..    Implant information obtained through [employer] documents on implants from 2010 surgery for the arm/left elbow reports:              Synthes screw cortex 3.5  mm model 204.814 x2  Synthes locking screw 2.7 X 2 model number 202.226 x1  Synthes locking screw 2.7 X 2 model number 202.222 x1  Synthes ST cortical screw, 3 model number 204.820 x1  Synthes ST cortical screw 3 model number 204.818 x1  Synthes K-wire 1.25 x 1  Synthes 2.5 drill bit x 2  Synthes posterior lateral plate x 1            NO MRI IMAGING OF THE BRAIN.    Results for orders placed during the hospital encounter of 02/12/24        CT Head Without Contrast    Narrative  EXAMINATION:  CT HEAD WITHOUT CONTRAST    CLINICAL HISTORY:  Dizziness, persistent/recurrent, cardiac or vascular cause suspected; Dizziness and giddiness    TECHNIQUE:  Low dose axial CT images obtained throughout the head without intravenous contrast. Sagittal and coronal reconstructions were performed.    COMPARISON:  None.    FINDINGS:  Brain: There is no evidence of a parenchymal mass, edema, midline shift, or intracranial hemorrhage. There is a right of midline retro cerebellar CSF density fluid collection measuring 1.7 x 4.2 x 3.8 cm and maximal AP by TV by CC dimensions.  There is smooth scalloping inner table of the calvarium and mild mass effect on the right posterior cerebellum.  No CT evidence of an acute major vascular territorial infarct.    Ventricles: The ventricles, sulci, and cisterns are within normal limits.    Skull: The osseous structures are unremarkable in appearance.    Extracranial soft tissues: Limited imaging is within normal limits.    Other: Developmentally aplastic frontal sinuses.  Visualized ethmoid and maxillary sinuses demonstrate mild mucosal thickening.    Impression  1. Incidental CSF density right retro cerebellar fluid collection consistent with a small arachnoid cyst measuring up to 4.2 cm.  2. Otherwise negative head CT.  No acute process.      Electronically signed by: Galdion Kilpatrick  Date:    02/12/2024  Time:    08:31    Labs:  WBC   Date Value Ref Range Status   02/06/2024 4.50 3.90 - 12.70 K/uL Final      Hemoglobin   Date Value Ref Range Status   02/06/2024 14.1 12.0 - 16.0 g/dL Final     Platelets   Date Value Ref Range Status   02/06/2024 247 150 - 450 K/uL Final     Creatinine   Date Value Ref Range Status   02/06/2024 0.9 0.5 - 1.4 mg/dL Final     TSH   Date Value Ref Range Status   02/06/2024 2.614 0.400 - 4.000 uIU/mL Final     Hemoglobin A1C   Date Value Ref Range Status   02/06/2024 5.4 4.0 - 5.6 % Final     Comment:     ADA Screening Guidelines:  5.7-6.4%  Consistent with prediabetes  >or=6.5%  Consistent with diabetes    High levels of fetal hemoglobin interfere with the HbA1C  assay. Heterozygous hemoglobin variants (HbS, HgC, etc)do  not significantly interfere with this assay.   However, presence of multiple variants may affect accuracy.         Past Medical History  She has a past medical history of Acid reflux.    Family / Surgical / Social History  Her family history includes Cancer in her maternal grandfather and sister; Stomach cancer in her maternal grandfather.    Past Surgical History:   Procedure Laterality Date    CYST REMOVAL Right 8/24/2023    Procedure: EXCISION, CYST - RIF mucus cyst;  Surgeon: Rasta Perez MD;  Location: [facility] OR;  Service: Orthopedics;  Laterality: Right;  right index finger    DILATION AND CURETTAGE OF UTERUS      ELBOW FRACTURE SURGERY      ESOPHAGOGASTRODUODENOSCOPY N/A 3/31/2023    Procedure: EGD (ESOPHAGOGASTRODUODENOSCOPY);  Surgeon: Stefano Robledo MD;  Location: [facility];  Service: Endoscopy;  Laterality: N/A;       Social History     Tobacco Use    Smoking status: Never     Passive exposure: Never    Smokeless tobacco: Never   Substance and Sexual Activity    Alcohol use: Not Currently     Comment: occ    Drug use: No    Sexual activity: Yes     Partners: Male     Birth control/protection: Post-menopausal       Medications  She has a current medication list which includes the following prescription(s): famotidine and  "multivitamin.      Allergies  Review of patient's allergies indicates:  No Known Allergies    All medications, allergies, and past history have been reviewed.    Objective:      Vitals:      2/6/2024     7:05 AM 2/15/2024     1:35 PM 3/7/2024     8:31 AM   Vitals - 1 value per visit   SYSTOLIC 120  133   DIASTOLIC 76  77   Pulse 76  56   Temp 97.8 °F (36.6 °C)     Resp 18     SPO2 99 %     Weight (lb) 191.8 191 190.7   Weight (kg) 87 86.637 86.5   Height 5' 3" (1.6 m) 5' 3" (1.6 m) 5' 3" (1.6 m)   BMI (Calculated) 34 33.8 33.8   Pain Score Zero  Zero       Body surface area is 1.96 meters squared.    Physical Exam:    GENERAL  APPEARANCE -  alert, appears stated age, and cooperative  BARRIER(S) TO COMMUNICATION -  none VOICE - appropriate for age and gender    INTEGUMENTARY  no suspicious head and neck lesions    HEENT  HEAD: Normocephalic, without obvious abnormality, atraumatic  FACE: INSPECTION - Symmetric, no signs of trauma, no suspicious lesion(s)      STRENGTH - facial symmetry intact     PALPATION -  No masses     SALIVARY GLANDS - non-tender with no appreciable mass    NECK/THYROID: normal atraumatic, no neck masses, normal thyroid, no jvd    EYES  Normal occular alignment and mobility with no visible nystagmus at rest    EARS/NOSE/MOUTH/THROAT  EARS  PINNAE AND EXTERNAL EARS - no suspicious lesion OTOSCOPIC EXAM (surgical microscopy was not used for visualization/instrumentation): EAR EXAM - Normal ear canals, tympanic membranes and mobility, and middle ear spaces bilaterally.  HEARING - grossly intact to voice/finger rub    NOSE AND SINUSES  EXTERNAL NOSE - Grossly normal for age/sex  SEPTUM - normal/no obstruction on anterior exam without decongestion TURBINATES - within normal limits MUCOSA - within normal limits     MOUTH AND THROAT   ORAL CAVITY, LIPS, TEETH, GUMS & TONGUE - moist, no suspicious lesions  OROPHARYNX /TONSILS/PHARYNGEAL WALLS/HYPOPHARYNX - no erythema or exudates  NASOPHARYNX - limited " mirror exam - unable to visualize due to anatomy/gag  LARYNX -  - limited mirror exam - unable to visualize due to anatomy/gag      CHEST AND LUNG   INSPECTION & AUSCULTATION - normal effort, no stridor    CARDIOVASCULAR  AUSCULTATION & PERIPHERAL VASCULAR - regular rate and rhythm.    NEUROLOGIC  MENTAL STATUS - alert, interactive CRANIAL NERVES - normal    GONZALO-HALLPIKE - RIGHT - no nystagmus LEFT - no nystagmus  HEAD THRUSTS - Catch up saccades absent bilaterally   FUKUDA - normal/midline  RHOMBERG - Tandem (No) - normal  GAIT - normal w/o drift and normal turn bilaterally    LYMPHATIC  HEAD AND NECK - non-palpable; SUPRACLAVICULAR - deferred      Procedure(s):  None          Assessment:      Problem List Items Addressed This Visit    None  Visit Diagnoses       Dizziness and giddiness    -  Primary    Hearing loss sensory, bilateral        Tinnitus, bilateral        Abnormal CT of the head                     Plan:      Negative orthostatics.  Normal vestibular exam.  History more consistent with nonspecific central dizziness/lightheadedness then an actual vestibular dysfunction.  VNG deferred at this time.  MRI reportedly recommended by Neurology based on eConsult but deferred due to metal implants in the arm.  I have attached the implant record as above with Synthes plates and screws listed.  Surgery is performed in 2010.  There was a K-wire used.  I am not sure if this was implanted or if this all is actually non ferromagnetic.  I defer to radiology or others and making this determination as to whether it is possible/safe to proceed with MRI scanning which is typically the case after orthopedic implantation.    Follow up as needed.  Recommend follow up with Neurology after MRI scanning.  VNG testing and further ENT evaluation on an as needed basis.  Patient should have her annual audiogram which is scheduled today.      TIME:  >40-minute total visit time including reviewing data/results and coordinating care.  50% of time spent face to face including counseling.      Voice recognition software was used in the creation of this note/communication and any sound-alike errors which may have occurred from its use should be taken in context when interpreting.  If such errors prevent a clear understanding of the note/communication, please contact the office for clarification.

## 2024-03-08 ENCOUNTER — TELEPHONE (OUTPATIENT)
Dept: FAMILY MEDICINE | Facility: CLINIC | Age: 59
End: 2024-03-08
Payer: COMMERCIAL

## 2024-03-08 DIAGNOSIS — G93.0 ARACHNOID CYST: Primary | ICD-10-CM

## 2024-03-08 NOTE — TELEPHONE ENCOUNTER
----- Message from Kaley Rivera sent at 3/8/2024  7:14 AM CST -----  Contact: Pt's   Type:  Sooner Appointment Request    Caller is requesting a sooner appointment.  Caller declined first available appointment listed below.  Caller will not accept being placed on the waitlist and is requesting a message be sent to doctor.    Name of Caller:  Pt's    When is the first available appointment?  7/18  Symptoms:  needs neuro referral, not ent, CAT scan result f/up  Would the patient rather a call back or a response via [facility]? call  Best Call Back Number:  [phone]  Additional Information:  Please call the patient's  Tach back at the phone number listed above to advise. Thank you!

## 2024-03-12 ENCOUNTER — CLINICAL SUPPORT (OUTPATIENT)
Dept: AUDIOLOGY | Facility: CLINIC | Age: 59
End: 2024-03-12
Payer: COMMERCIAL

## 2024-03-12 DIAGNOSIS — H90.3 ASYMMETRICAL SENSORINEURAL HEARING LOSS: Primary | ICD-10-CM

## 2024-03-12 PROCEDURE — 92567 TYMPANOMETRY: CPT | Mod: S$GLB,,, | Performed by: AUDIOLOGIST

## 2024-03-12 PROCEDURE — 92557 COMPREHENSIVE HEARING TEST: CPT | Mod: S$GLB,,, | Performed by: AUDIOLOGIST

## 2024-03-12 PROCEDURE — 99999 PR PBB SHADOW E&M-EST. PATIENT-LVL I: CPT | Mod: PBBFAC,,, | Performed by: AUDIOLOGIST

## 2024-03-12 NOTE — PROGRESS NOTES
Clyde Arellano was seen 03/12/2024 for an audiological evaluation. Pt was alone during today's visit. Pertinent complaints today include dizziness. Pt denies history of loud noise exposure and denies early onset of genetic family history of hearing loss. Otoscopy revealed no cerumen in both ears. The tympanic membrane was visualized AU prior to proceeding with the hearing testing.     Results reveal hearing within normal limits for the right ear and  mild-to-moderate sensorineural hearing loss for the left ear.    Speech Reception Thresholds were  20 dBHL for the right ear and 25 dBHL for the left ear.    Word recognition scores were excellent for the right ear and excellent for the left ear.   Tympanograms were Type A for the right ear and Type A for the left ear.    Recommendations: 1) Follow up with ENT     2) Annual hearing evaluation      3) Wear hearing protection around noise    Audiogram results were reviewed in detail with patient and all questions were answered. Results will be reviewed by the referring provider at the completion of this note. All complaints were addressed during this visit to the patient's satisfaction.

## 2024-03-12 NOTE — Clinical Note
Audiogram was completed today. Results reveal hearing within normal limits for the right ear and  mild-to-moderate sensorineural hearing loss for the left ear.    Speech Reception Thresholds were  20 dBHL for the right ear and 25 dBHL for the left ear.    Word recognition scores were excellent for the right ear and excellent for the left ear.   Tympanograms were Type A for the right ear and Type A for the left ear.  Recommendations: 1) Follow up with ENT    2) Annual hearing evaluation     3) Wear hearing protection around noise Thank you, Tadeo Bolanos CCC-A

## 2024-03-13 ENCOUNTER — TELEPHONE (OUTPATIENT)
Dept: FAMILY MEDICINE | Facility: CLINIC | Age: 59
End: 2024-03-13
Payer: COMMERCIAL

## 2024-03-13 NOTE — TELEPHONE ENCOUNTER
----- Message from Clara Montero sent at 3/13/2024  9:36 AM CDT -----  Contact:   Type: Needs Medical Advice  Who Called:  Tach/     Best Call Back Number: [phone]    Additional Information: States he would like to speak with office regarding getting pt referral for neurology.Please call back

## 2024-04-11 ENCOUNTER — OFFICE VISIT (OUTPATIENT)
Dept: NEUROSURGERY | Facility: CLINIC | Age: 59
End: 2024-04-11
Payer: COMMERCIAL

## 2024-04-11 VITALS
HEIGHT: 63 IN | BODY MASS INDEX: 33.79 KG/M2 | RESPIRATION RATE: 18 BRPM | WEIGHT: 190.69 LBS | HEART RATE: 59 BPM | DIASTOLIC BLOOD PRESSURE: 79 MMHG | SYSTOLIC BLOOD PRESSURE: 135 MMHG

## 2024-04-11 DIAGNOSIS — G93.0 ARACHNOID CYST: ICD-10-CM

## 2024-04-11 PROCEDURE — 3075F SYST BP GE 130 - 139MM HG: CPT | Mod: CPTII,S$GLB,, | Performed by: NEUROLOGICAL SURGERY

## 2024-04-11 PROCEDURE — 3044F HG A1C LEVEL LT 7.0%: CPT | Mod: CPTII,S$GLB,, | Performed by: NEUROLOGICAL SURGERY

## 2024-04-11 PROCEDURE — 99205 OFFICE O/P NEW HI 60 MIN: CPT | Mod: S$GLB,,, | Performed by: NEUROLOGICAL SURGERY

## 2024-04-11 PROCEDURE — 1159F MED LIST DOCD IN RCRD: CPT | Mod: CPTII,S$GLB,, | Performed by: NEUROLOGICAL SURGERY

## 2024-04-11 PROCEDURE — 3008F BODY MASS INDEX DOCD: CPT | Mod: CPTII,S$GLB,, | Performed by: NEUROLOGICAL SURGERY

## 2024-04-11 PROCEDURE — 3078F DIAST BP <80 MM HG: CPT | Mod: CPTII,S$GLB,, | Performed by: NEUROLOGICAL SURGERY

## 2024-04-11 NOTE — PROGRESS NOTES
Neurosurgery History & Physical    Patient ID: Clyde Arellano is a 59 y.o. female.    Chief Complaint   Patient presents with    Headache     Patient present to clinic with c/o headaches, x 2 per week.  Reports pressure in the back of the head, tinnitus.        HPI:  Ms. Arellano is a 59-year-old female who was experiencing some dizziness over the last couple of months.  These symptoms have largely resolved by now however during the course of this workup she had a CT scan of the head.  This was back in February.  It identified a area of CSF in the posterior fossa that may be consistent with an arachnoid cyst.  She was seen in E consult by Neurology who did not feel this was related to dizziness but recommended an MRI of the brain with and without contrast.  The patient has a history of a humerus fracture which was treated with internal fixation in 2010.  The patient is unsure of whether or not she can have an MRI and she thinks she was told by a medical practitioner at that time that she should not have an MRI.  Therefore, she has not undergone MRI thus far.    Review of Systems   Constitutional:  Negative for activity change and fever.   HENT:  Negative for rhinorrhea, tinnitus, trouble swallowing and voice change.    Eyes:  Negative for visual disturbance.   Respiratory:  Negative for shortness of breath.    Cardiovascular:  Negative for chest pain.   Gastrointestinal:  Negative for nausea and vomiting.   Endocrine: Negative for cold intolerance, heat intolerance, polydipsia, polyphagia and polyuria.   Genitourinary:  Negative for decreased urine volume, frequency and urgency.   Musculoskeletal:  Negative for back pain, neck pain and neck stiffness.   Neurological:  Negative for dizziness, tremors, seizures, syncope, facial asymmetry, speech difficulty, weakness, light-headedness, numbness and headaches.   Psychiatric/Behavioral:  Negative for confusion.        Past Medical History:   Diagnosis Date    Acid reflux  "     Social History     Socioeconomic History    Marital status:    Tobacco Use    Smoking status: Never     Passive exposure: Never    Smokeless tobacco: Never   Substance and Sexual Activity    Alcohol use: Not Currently     Comment: occ    Drug use: No    Sexual activity: Yes     Partners: Male     Birth control/protection: Post-menopausal     Family History   Problem Relation Age of Onset    Cancer Sister         lymph/kidney    Stomach cancer Maternal Grandfather         diagnosed in 40s    Cancer Maternal Grandfather         stomach    Esophageal cancer Neg Hx     Colon cancer Neg Hx     Colon polyps Neg Hx      Review of patient's allergies indicates:  No Known Allergies    Current Outpatient Medications:     famotidine (PEPCID) 10 MG tablet, Take 1 tablet (10 mg total) by mouth 2 (two) times daily as needed for Heartburn., Disp: , Rfl:     multivitamin capsule, Take 1 capsule by mouth once daily., Disp: , Rfl:   Blood pressure 135/79, pulse (!) 59, resp. rate 18, height 5' 3" (1.6 m), weight 86.5 kg (190 lb 11.2 oz), last menstrual period 01/01/2009.      Neurologic Exam     Mental Status   Oriented to person, place, and time.   Attention: normal.   Speech: speech is normal   Level of consciousness: alert  Knowledge: good.     Cranial Nerves     CN III, IV, VI   Extraocular motions are normal.     CN VII   Facial expression full, symmetric.     Motor Exam   Muscle bulk: normal  Overall muscle tone: normal    Strength   Strength 5/5 throughout.     Sensory Exam   Light touch normal.     Gait, Coordination, and Reflexes     Gait  Gait: normal      Physical Exam  Eyes:      Extraocular Movements: EOM normal.   Neurological:      Mental Status: She is oriented to person, place, and time.      Motor: Motor strength is normal.     Gait: Gait is intact.   Psychiatric:         Speech: Speech normal.         Imaging:  CT scan of the head without contrast dated 02/12/2024 is personally reviewed and discussed " with the patient.  There is a collection in the posterior fossa that is asymmetric to the right side that is isodense with CSF consistent with an arachnoid cyst.  The posterior aspect of the right cerebellar hemisphere is smaller in size than the left as a result of this fluid collection though I do to not detect significant mass effect at the level of the 4th ventricle or at the level of the brainstem or cerebellopontine angle.    Assessment/Plan:   Ms. Arellano is a 59-year-old female with incidental finding posterior fossa arachnoid cyst.  I do not feel this is symptomatic.  I do think that thorough evaluation includes an MRI of the brain with and without contrast.    We will contact Radiology with information about her implants determine whether or not she can have an MRI of the brain if she can she should have an MRI of the brain with and without contrast.  If she can not have an MRI of the brain then we will follow her up in 1 year with a repeat CT scan of the head with and without contrast.

## 2024-04-12 DIAGNOSIS — G93.0 ARACHNOID CYST: Primary | ICD-10-CM

## 2024-05-06 ENCOUNTER — TELEPHONE (OUTPATIENT)
Dept: NEUROSURGERY | Facility: CLINIC | Age: 59
End: 2024-05-06
Payer: COMMERCIAL

## 2024-05-06 NOTE — TELEPHONE ENCOUNTER
----- Message from Janeth Meyers LPN sent at 5/3/2024  5:04 PM CDT -----    ----- Message -----  From: Danisha Herman  Sent: 5/3/2024   4:57 PM CDT  To: Jhony JORDAN Staff    Type:  Needs Medical Advice    Who Called: pt  Would the patient rather a call back or a response via [facility]? call  Best Call Back Number:  [phone]  Additional Information: pt would like a call back regarding upcoming 5/8 appts states she wasn't notified about appts regarding her MRI due to metal in her arm

## 2024-07-22 ENCOUNTER — OFFICE VISIT (OUTPATIENT)
Dept: URGENT CARE | Facility: CLINIC | Age: 59
End: 2024-07-22
Payer: COMMERCIAL

## 2024-07-22 VITALS
BODY MASS INDEX: 33.66 KG/M2 | OXYGEN SATURATION: 99 % | HEIGHT: 63 IN | SYSTOLIC BLOOD PRESSURE: 146 MMHG | TEMPERATURE: 98 F | RESPIRATION RATE: 16 BRPM | WEIGHT: 190 LBS | DIASTOLIC BLOOD PRESSURE: 87 MMHG | HEART RATE: 66 BPM

## 2024-07-22 DIAGNOSIS — M25.531 RIGHT WRIST PAIN: ICD-10-CM

## 2024-07-22 DIAGNOSIS — S63.501A WRIST SPRAIN, RIGHT, INITIAL ENCOUNTER: ICD-10-CM

## 2024-07-22 DIAGNOSIS — Z02.6 ENCOUNTER RELATED TO WORKER'S COMPENSATION CLAIM: Primary | ICD-10-CM
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
## 2024-07-22 PROCEDURE — 73110 X-RAY EXAM OF WRIST: CPT | Mod: RT,S$GLB,, | Performed by: RADIOLOGY

## 2024-07-22 NOTE — PATIENT INSTRUCTIONS
Rest  ICE  Elevate  Compression with wrist brace    NSAIDs for relief of pain and inflammation as directed over the counter    Avoid aggravating affected area.

## 2024-07-22 NOTE — PROGRESS NOTES
Subjective:      Patient ID: Clyde Arellano is a 59 y.o. female.    Chief Complaint: Wrist Injury    W/C initial visit.  Date of injury: 7/22/24.  Patient works at [employer].  Patient states she slipped and fell due to water on floor, and tried to catch herself with right wrist.  Right wrist is painful when lifting heavier objects.     Wrist Injury   Her dominant hand is their right hand. The incident occurred 1 to 3 hours ago. The injury mechanism was a fall. The pain is present in the left wrist. Quality: throbbing. The pain is at a severity of 3/10. Pertinent negatives include no numbness. The symptoms are aggravated by lifting.       Constitution: Negative for chills, fatigue and fever.   HENT: Negative.     Cardiovascular: Negative.    Respiratory: Negative.     Gastrointestinal: Negative.    Musculoskeletal:  Positive for trauma (slip and fall) and joint pain. Negative for joint swelling and abnormal ROM of joint.   Skin:  Negative for erythema and bruising.   Neurological: Negative.  Negative for numbness and tingling.     Objective:     Physical Exam  Vitals and nursing note reviewed.   Constitutional:       General: She is not in acute distress.     Appearance: Normal appearance. She is not ill-appearing.   HENT:      Head: Normocephalic and atraumatic.      Right Ear: External ear normal.      Left Ear: External ear normal.      Nose: Nose normal.      Mouth/Throat:      Mouth: Mucous membranes are moist.   Eyes:      Conjunctiva/sclera: Conjunctivae normal.   Cardiovascular:      Rate and Rhythm: Normal rate.   Musculoskeletal:         General: Tenderness and signs of injury present. No swelling or deformity.      Right wrist: Tenderness present. No swelling, deformity, snuff box tenderness or crepitus. Decreased range of motion.        Arms:    Skin:     Findings: No erythema.   Neurological:      Mental Status: She is alert and oriented to person, place, and time.      Motor: No weakness.   Psychiatric:          Mood and Affect: Mood normal.         Behavior: Behavior normal.         Thought Content: Thought content normal.         Judgment: Judgment normal.        Assessment:      1. Encounter related to worker's compensation claim    2. Fall, initial encounter    3. Wrist sprain, right, initial encounter    4. Right wrist pain    EXAMINATION:  XR WRIST COMPLETE 3 VIEWS RIGHT     CLINICAL HISTORY:  Unspecified fall, initial encounter     TECHNIQUE:  PA, lateral, and oblique views of the right wrist were performed.     COMPARISON:  None     FINDINGS:  There is no evidence fracture or malalignment.  The adjacent soft tissues are unremarkable.     Impression:     There is no evidence acute bony injury of the right wrist.        Electronically signed by:Ashlie Gonzalez MD  Date:                                            07/22/2024  Time:                                           14:01  Plan:     Wrist brace or ace wrap for support.   NSAIDs OTC for pain  RICE    RTC in 1 week for re-eval or sooner if needed.        Patient Instructions: Attention not to aggravate affected area, Use splint as directed   Restrictions: No lifting/pushing/pulling more than 10 lbs, Limited use of right hand and arm  No follow-ups on file.

## 2024-07-24 ENCOUNTER — NURSE TRIAGE (OUTPATIENT)
Dept: ADMINISTRATIVE | Facility: CLINIC | Age: 59
End: 2024-07-24
Payer: COMMERCIAL

## 2024-07-24 ENCOUNTER — OFFICE VISIT (OUTPATIENT)
Dept: FAMILY MEDICINE | Facility: CLINIC | Age: 59
End: 2024-07-24
Payer: COMMERCIAL

## 2024-07-24 VITALS
HEIGHT: 63 IN | HEART RATE: 69 BPM | BODY MASS INDEX: 33.6 KG/M2 | RESPIRATION RATE: 16 BRPM | SYSTOLIC BLOOD PRESSURE: 122 MMHG | OXYGEN SATURATION: 98 % | TEMPERATURE: 98 F | DIASTOLIC BLOOD PRESSURE: 70 MMHG | WEIGHT: 189.63 LBS

## 2024-07-24 DIAGNOSIS — U07.1 COVID-19 VIRUS DETECTED: ICD-10-CM

## 2024-07-24 DIAGNOSIS — U07.1 COVID: Primary | ICD-10-CM

## 2024-07-24 LAB
CTP QC/QA: YES
SARS-COV-2 RDRP RESP QL NAA+PROBE: POSITIVE

## 2024-07-24 PROCEDURE — 99999 PR PBB SHADOW E&M-EST. PATIENT-LVL IV: CPT | Mod: PBBFAC,,,

## 2024-07-24 PROCEDURE — 99213 OFFICE O/P EST LOW 20 MIN: CPT | Mod: S$GLB,,,

## 2024-07-24 PROCEDURE — 87635 SARS-COV-2 COVID-19 AMP PRB: CPT | Mod: QW,S$GLB,,

## 2024-07-24 PROCEDURE — 3078F DIAST BP <80 MM HG: CPT | Mod: CPTII,S$GLB,,

## 2024-07-24 PROCEDURE — 3074F SYST BP LT 130 MM HG: CPT | Mod: CPTII,S$GLB,,

## 2024-07-24 PROCEDURE — 3044F HG A1C LEVEL LT 7.0%: CPT | Mod: CPTII,S$GLB,,

## 2024-07-24 PROCEDURE — 3008F BODY MASS INDEX DOCD: CPT | Mod: CPTII,S$GLB,,

## 2024-07-24 PROCEDURE — 1159F MED LIST DOCD IN RCRD: CPT | Mod: CPTII,S$GLB,,

## 2024-07-24 PROCEDURE — 1160F RVW MEDS BY RX/DR IN RCRD: CPT | Mod: CPTII,S$GLB,,

## 2024-07-24 RX ORDER — CETIRIZINE HYDROCHLORIDE 10 MG/1
10 TABLET ORAL DAILY
Qty: 90 TABLET | Refills: 3 | Status: SHIPPED | OUTPATIENT
Start: 2024-07-24 | End: 2025-07-24

## 2024-07-24 RX ORDER — ONDANSETRON 4 MG/1
4 TABLET, ORALLY DISINTEGRATING ORAL 2 TIMES DAILY
Qty: 20 TABLET | Refills: 0 | Status: SHIPPED | OUTPATIENT
Start: 2024-07-24 | End: 2024-08-03

## 2024-07-24 RX ORDER — FLUTICASONE PROPIONATE 50 MCG
1 SPRAY, SUSPENSION (ML) NASAL DAILY
Qty: 11.1 ML | Refills: 0 | Status: SHIPPED | OUTPATIENT
Start: 2024-07-24

## 2024-07-24 NOTE — PROGRESS NOTES
Subjective:       Patient ID:  [id number]     Chief Complaint: Cough (Head ache )      Clyde cast 59 y.o. female who presents to the clinic for     Patient reports cold-like symptoms that have been present for 3 days. Symptoms include chills, headache, sinus congestion , sore throat, Dry cough, nausea, and vomiting x1  . Patient has tried Dayquil over the counter which has been helpful.   She denies shortness of breath, wheezing, and chest pain.    No other concerns.    Past Medical History:   Diagnosis Date    Acid reflux       Active Problem List with Overview Notes    Diagnosis Date Noted    Chronic heel pain, left 11/07/2017    Plantar fasciitis of left foot 11/07/2017      Review of patient's allergies indicates:  No Known Allergies      Current Outpatient Medications:     famotidine (PEPCID) 10 MG tablet, Take 1 tablet (10 mg total) by mouth 2 (two) times daily as needed for Heartburn., Disp: , Rfl:     multivitamin capsule, Take 1 capsule by mouth once daily., Disp: , Rfl:     cetirizine (ZYRTEC) 10 MG tablet, Take 1 tablet (10 mg total) by mouth once daily., Disp: 90 tablet, Rfl: 3    fluticasone propionate (FLONASE) 50 mcg/actuation nasal spray, 1 spray (50 mcg total) by Each Nostril route once daily., Disp: 11.1 mL, Rfl: 0    ondansetron (ZOFRAN-ODT) 4 MG TbDL, Take 1 tablet (4 mg total) by mouth 2 (two) times daily. for 10 days, Disp: 20 tablet, Rfl: 0    Lab Results   Component Value Date    WBC 4.50 02/06/2024    HGB 14.1 02/06/2024    HCT 41.9 02/06/2024     02/06/2024    CHOL 207 (H) 02/06/2024    TRIG 81 02/06/2024    HDL 52 02/06/2024    ALT 19 02/06/2024    AST 22 02/06/2024     02/06/2024    K 4.3 02/06/2024     02/06/2024    CREATININE 0.9 02/06/2024    BUN 17 02/06/2024    CO2 23 02/06/2024    TSH 2.614 02/06/2024    HGBA1C 5.4 02/06/2024       Review of Systems   Constitutional:  Negative for fatigue and fever.   HENT:  Positive for congestion and sore throat. Negative  for sneezing.    Eyes: Negative.    Respiratory:  Positive for cough. Negative for shortness of breath and wheezing.    Cardiovascular:  Negative for chest pain, palpitations and leg swelling.   Gastrointestinal:  Positive for nausea and vomiting. Negative for abdominal pain.   Genitourinary: Negative.    Musculoskeletal: Negative.  Negative for arthralgias.   Skin: Negative.  Negative for rash.   Neurological:  Negative for dizziness, weakness, light-headedness, numbness and headaches.   Hematological: Negative.    Psychiatric/Behavioral: Negative.         Objective:      Physical Exam  Constitutional:       Appearance: Normal appearance.   HENT:      Head: Normocephalic and atraumatic.      Nose: Nose normal.      Right Turbinates: Enlarged and swollen.      Left Turbinates: Enlarged and swollen.      Right Sinus: No maxillary sinus tenderness or frontal sinus tenderness.      Left Sinus: No maxillary sinus tenderness or frontal sinus tenderness.      Mouth/Throat:      Pharynx: Posterior oropharyngeal erythema present.   Eyes:      Extraocular Movements: Extraocular movements intact.   Cardiovascular:      Rate and Rhythm: Normal rate and regular rhythm.   Pulmonary:      Effort: Pulmonary effort is normal.      Breath sounds: Normal breath sounds.   Musculoskeletal:         General: Normal range of motion.      Cervical back: Normal range of motion.   Skin:     General: Skin is warm and dry.   Neurological:      General: No focal deficit present.      Mental Status: She is alert and oriented to person, place, and time.   Psychiatric:         Mood and Affect: Mood normal.         Assessment:       1. COVID        Plan:       Clyde was seen today for cough.    Diagnoses and all orders for this visit:    COVID  - COVID risk 2 .  Given symptoms are mild will forego antiviral therapy.  -     POCT COVID-19 Rapid Screening  -     fluticasone propionate (FLONASE) 50 mcg/actuation nasal spray; 1 spray (50 mcg total) by  Each Nostril route once daily.  -     cetirizine (ZYRTEC) 10 MG tablet; Take 1 tablet (10 mg total) by mouth once daily.  -     ondansetron (ZOFRAN-ODT) 4 MG TbDL; Take 1 tablet (4 mg total) by mouth 2 (two) times daily. for 10 days  - Warm showers recommended   - rest and increased fluids recommended   - ibuprofen/tylenol PRN for fevers  - advised close follow-up if symptoms worsen or do not improve      Keep upcoming primary care appointment with Dr. Soares.    Portions of this note were dictated using voice recognition software and may contain dictation related errors in spelling / grammar / syntax not discovered on text review.     Fariba Rodriguze PA-C

## 2024-07-24 NOTE — TELEPHONE ENCOUNTER
Pt reports on Monday went to  because she fell at work injured wrist then Monday night started to have scratchy throat Tuesday began coughing Tuesday evening started vomiting then fever. Concerned about possibly having covid.per protocol instructed pt to go to ED/[facility]/pcp on approval. Sent secure chat to Subha PENG instructed if pt having mild symptoms and can tolerate po safe to go to PCP. Pt confirms can tolerate po and only vomited last night. Scheduled pt apt at pcp office for 10am today.   Reason for Disposition   Drinking very little and dehydration suspected (e.g., no urine > 12 hours, very dry mouth, very lightheaded)    Additional Information   Negative: SEVERE difficulty breathing (e.g., struggling for each breath, speaks in single words)   Negative: Difficult to awaken or acting confused (e.g., disoriented, slurred speech)   Negative: Bluish (or gray) lips or face now   Negative: Shock suspected (e.g., cold/pale/clammy skin, too weak to stand, low BP, rapid pulse)   Negative: Sounds like a life-threatening emergency to the triager   Negative: SEVERE or constant chest pain or pressure  (Exception: Mild central chest pain, present only when coughing.)   Negative: MODERATE difficulty breathing (e.g., speaks in phrases, SOB even at rest, pulse 100-120)   Negative: Headache and stiff neck (can't touch chin to chest)   Negative: Oxygen level (e.g., pulse oximetry) 90% or lower   Negative: Chest pain or pressure  (Exception: MILD central chest pain, present only when coughing.)    Protocols used: Coronavirus (COVID-19) Diagnosed or [id number]

## 2024-07-26 ENCOUNTER — TELEPHONE (OUTPATIENT)
Dept: FAMILY MEDICINE | Facility: CLINIC | Age: 59
End: 2024-07-26
Payer: COMMERCIAL

## 2024-07-26 NOTE — LETTER
July 26, 2024      [facility]  [address] [place] [address]  Phone: [phone]  Fax: [phone]       Patient: Clyde Arellano   YOB: 1965  Date of Visit: 07/26/2024    To Whom It May Concern:  Clyde Arellano  was at [facility] on 07/24/24. The patient may return to work on 07/29/24 with no restrictions. If you have any questions or concerns, or if I can be of further assistance, please do not hesitate to contact me.    Sincerely,    Fariba Rodriguez PA-C

## 2024-07-26 NOTE — TELEPHONE ENCOUNTER
Letter created & printed. Call placed to patient for notification. Patient verbalized understanding.

## 2024-07-26 NOTE — TELEPHONE ENCOUNTER
Patient reports she performed office visit on 7/24/24 and was diagnosed with Covid.  States work excuse indicates she must return to work on tomorrow, but she in not feeling well enough to return.  Requesting to extend work excuse until Monday.  Please advise. Thank you.

## 2024-07-26 NOTE — TELEPHONE ENCOUNTER
Subhash Mejia Staff     ----- Message from Subhash Mejia sent at 7/26/2024 12:09 PM CDT -----  Contact: Self  Type: Needs Medical Advice  Who Called:  Patient    Best Call Back Number: [phone]   Additional Information:  Called to speak with office regarding getting a new work excuse extended to 7/29

## 2025-04-23 ENCOUNTER — PATIENT OUTREACH (OUTPATIENT)
Dept: ADMINISTRATIVE | Facility: HOSPITAL | Age: 60
End: 2025-04-23
Payer: COMMERCIAL

## 2025-04-23 NOTE — LETTER
April 23, 2025    Clyde Arellano  [address]  [place] [address]             [facility]  [address]  [place] [facility] [address]  Phone: [phone] Dear Rodger Tang, Jj Montero MD has entered your screening mammogram order.  You can schedule this Mammogram exam through the link in your [facility] portal, provided on the Appointment Center home page.  Should you need assistance with scheduling, you can call the main line at [phone]. Our scheduling specialists will be able to help you coordinate your appointment.    If you recently had your mammogram screening outside of [facility], please let your primary care team know so that they can update your health record.       Sincerely,        Your [facility]  Lupe Fernandez, Care Coordinator  Phone: [phone]  FAX: [phone]

## 2025-08-20 ENCOUNTER — PATIENT MESSAGE (OUTPATIENT)
Dept: ADMINISTRATIVE | Facility: HOSPITAL | Age: 60
End: 2025-08-20
Payer: COMMERCIAL

## (undated) DEVICE — TOWEL OR DISP STRL BLUE 4/PK

## (undated) DEVICE — DRESSING XEROFORM FOIL PK 1X8

## (undated) DEVICE — DRAPE STERI-DRAPE 1000 17X11IN

## (undated) DEVICE — PACK SET UP 190 OMC-NS

## (undated) DEVICE — BANDAGE ESMARK ELASTIC ST 4X9

## (undated) DEVICE — TOURNIQUET SB QC DP 18X4IN

## (undated) DEVICE — NDL SURE-SNAP HYPO SAF 21GX1.5

## (undated) DEVICE — DRAPE U SPLIT SHEET 54X76IN

## (undated) DEVICE — STRAP OR TABLE 5IN X 72IN

## (undated) DEVICE — GAUZE WOVEN STRL 12-PLY 4X4IN

## (undated) DEVICE — UNDERGLOVES BIOGEL PI SZ 7 LF

## (undated) DEVICE — SYR LUER LOCK STERILE 10ML

## (undated) DEVICE — CORD BIPOLAR 12 FOOT

## (undated) DEVICE — APPLICATOR CHLORAPREP ORN 26ML

## (undated) DEVICE — DRESSING XEROFORM NONADH 1X8IN

## (undated) DEVICE — GOWN POLY REINF BRTH SLV XL

## (undated) DEVICE — PACK SIRUS BASIC V SURG STRL

## (undated) DEVICE — SOL NACL IRR 1000ML BTL

## (undated) DEVICE — UNDERGLOVES BIOGEL PI SIZE 8

## (undated) DEVICE — STOCKINETTE TUBULAR 2PL 6 X 4

## (undated) DEVICE — DRAPE THREE-QTR REINF 53X77IN

## (undated) DEVICE — BLADE SURG #15 CARBON STEEL

## (undated) DEVICE — GLOVE SURGEONS ULTRA TOUCH 6.5

## (undated) DEVICE — BNDG COFLEX FOAM LF2 ST 3X5YD

## (undated) DEVICE — DRAPE HAND STERILE

## (undated) DEVICE — COVER SURG LIGHT HANDLE

## (undated) DEVICE — SUT 4/0 18IN ETHILON BL P3

## (undated) DEVICE — GLOVE SURG ULTRA TOUCH 7.5

## (undated) DEVICE — PAD CAST 2 IN X 4YDS STERILE

## (undated) DEVICE — BANDAGE MATRIX HK LOOP 2IN 5YD